# Patient Record
Sex: FEMALE | Race: WHITE | HISPANIC OR LATINO | Employment: OTHER | URBAN - NONMETROPOLITAN AREA
[De-identification: names, ages, dates, MRNs, and addresses within clinical notes are randomized per-mention and may not be internally consistent; named-entity substitution may affect disease eponyms.]

---

## 2023-09-11 ENCOUNTER — HOSPITAL ENCOUNTER (INPATIENT)
Facility: HOSPITAL | Age: 78
LOS: 2 days | Discharge: HOME | DRG: 871 | End: 2023-09-13
Attending: EMERGENCY MEDICINE | Admitting: FAMILY MEDICINE
Payer: MEDICARE

## 2023-09-11 ENCOUNTER — APPOINTMENT (EMERGENCY)
Dept: CT IMAGING | Facility: HOSPITAL | Age: 78
DRG: 871 | End: 2023-09-11
Payer: MEDICARE

## 2023-09-11 ENCOUNTER — APPOINTMENT (EMERGENCY)
Dept: RADIOLOGY | Facility: HOSPITAL | Age: 78
DRG: 871 | End: 2023-09-11
Payer: MEDICARE

## 2023-09-11 DIAGNOSIS — I26.99 ACUTE PULMONARY EMBOLISM, UNSPECIFIED PULMONARY EMBOLISM TYPE, UNSPECIFIED WHETHER ACUTE COR PULMONALE PRESENT (HCC): ICD-10-CM

## 2023-09-11 DIAGNOSIS — R68.89 RIGORS: Primary | ICD-10-CM

## 2023-09-11 DIAGNOSIS — I26.99 PULMONARY EMBOLISM (HCC): ICD-10-CM

## 2023-09-11 DIAGNOSIS — U07.1 SEPSIS DUE TO COVID-19 (HCC): ICD-10-CM

## 2023-09-11 DIAGNOSIS — A41.89 SEPSIS DUE TO COVID-19 (HCC): ICD-10-CM

## 2023-09-11 DIAGNOSIS — N39.0 UTI (URINARY TRACT INFECTION): ICD-10-CM

## 2023-09-11 DIAGNOSIS — U07.1 COVID-19: ICD-10-CM

## 2023-09-11 LAB
2HR DELTA HS TROPONIN: 0 NG/L
ALBUMIN SERPL BCP-MCNC: 4.2 G/DL (ref 3.5–5)
ALP SERPL-CCNC: 72 U/L (ref 34–104)
ALT SERPL W P-5'-P-CCNC: 16 U/L (ref 7–52)
ANION GAP SERPL CALCULATED.3IONS-SCNC: 9 MMOL/L
APTT PPP: 31 SECONDS (ref 23–37)
AST SERPL W P-5'-P-CCNC: 18 U/L (ref 13–39)
BACTERIA UR QL AUTO: ABNORMAL /HPF
BASOPHILS # BLD AUTO: 0.03 THOUSANDS/ÂΜL (ref 0–0.1)
BASOPHILS NFR BLD AUTO: 0 % (ref 0–1)
BILIRUB SERPL-MCNC: 0.47 MG/DL (ref 0.2–1)
BILIRUB UR QL STRIP: NEGATIVE
BNP SERPL-MCNC: 62 PG/ML (ref 0–100)
BUN SERPL-MCNC: 15 MG/DL (ref 5–25)
CALCIUM SERPL-MCNC: 9.3 MG/DL (ref 8.4–10.2)
CARDIAC TROPONIN I PNL SERPL HS: 4 NG/L
CARDIAC TROPONIN I PNL SERPL HS: 4 NG/L
CHLORIDE SERPL-SCNC: 98 MMOL/L (ref 96–108)
CLARITY UR: ABNORMAL
CO2 SERPL-SCNC: 24 MMOL/L (ref 21–32)
COLOR UR: YELLOW
CREAT SERPL-MCNC: 0.82 MG/DL (ref 0.6–1.3)
D DIMER PPP FEU-MCNC: 1.01 UG/ML FEU
EOSINOPHIL # BLD AUTO: 0.03 THOUSAND/ÂΜL (ref 0–0.61)
EOSINOPHIL NFR BLD AUTO: 0 % (ref 0–6)
ERYTHROCYTE [DISTWIDTH] IN BLOOD BY AUTOMATED COUNT: 14.3 % (ref 11.6–15.1)
FLUAV RNA RESP QL NAA+PROBE: NEGATIVE
FLUBV RNA RESP QL NAA+PROBE: NEGATIVE
GFR SERPL CREATININE-BSD FRML MDRD: 69 ML/MIN/1.73SQ M
GLUCOSE SERPL-MCNC: 134 MG/DL (ref 65–140)
GLUCOSE SERPL-MCNC: 137 MG/DL (ref 65–140)
GLUCOSE UR STRIP-MCNC: NEGATIVE MG/DL
HCT VFR BLD AUTO: 37.6 % (ref 34.8–46.1)
HGB BLD-MCNC: 12.7 G/DL (ref 11.5–15.4)
HGB UR QL STRIP.AUTO: ABNORMAL
IMM GRANULOCYTES # BLD AUTO: 0.05 THOUSAND/UL (ref 0–0.2)
IMM GRANULOCYTES NFR BLD AUTO: 1 % (ref 0–2)
KETONES UR STRIP-MCNC: ABNORMAL MG/DL
LACTATE SERPL-SCNC: 1 MMOL/L (ref 0.5–2)
LEUKOCYTE ESTERASE UR QL STRIP: ABNORMAL
LYMPHOCYTES # BLD AUTO: 1.24 THOUSANDS/ÂΜL (ref 0.6–4.47)
LYMPHOCYTES NFR BLD AUTO: 12 % (ref 14–44)
MCH RBC QN AUTO: 30.9 PG (ref 26.8–34.3)
MCHC RBC AUTO-ENTMCNC: 33.8 G/DL (ref 31.4–37.4)
MCV RBC AUTO: 92 FL (ref 82–98)
MONOCYTES # BLD AUTO: 0.93 THOUSAND/ÂΜL (ref 0.17–1.22)
MONOCYTES NFR BLD AUTO: 9 % (ref 4–12)
NEUTROPHILS # BLD AUTO: 8.01 THOUSANDS/ÂΜL (ref 1.85–7.62)
NEUTS SEG NFR BLD AUTO: 78 % (ref 43–75)
NITRITE UR QL STRIP: NEGATIVE
NON-SQ EPI CELLS URNS QL MICRO: ABNORMAL /HPF
NRBC BLD AUTO-RTO: 0 /100 WBCS
PH UR STRIP.AUTO: 5.5 [PH]
PLATELET # BLD AUTO: 340 THOUSANDS/UL (ref 149–390)
PMV BLD AUTO: 9.4 FL (ref 8.9–12.7)
POTASSIUM SERPL-SCNC: 4 MMOL/L (ref 3.5–5.3)
PROT SERPL-MCNC: 7.7 G/DL (ref 6.4–8.4)
PROT UR STRIP-MCNC: NEGATIVE MG/DL
RBC # BLD AUTO: 4.11 MILLION/UL (ref 3.81–5.12)
RBC #/AREA URNS AUTO: ABNORMAL /HPF
RSV RNA RESP QL NAA+PROBE: NEGATIVE
S PYO DNA THROAT QL NAA+PROBE: NOT DETECTED
SARS-COV-2 RNA RESP QL NAA+PROBE: POSITIVE
SODIUM SERPL-SCNC: 131 MMOL/L (ref 135–147)
SP GR UR STRIP.AUTO: 1.01 (ref 1–1.03)
UROBILINOGEN UR QL STRIP.AUTO: 0.2 E.U./DL
WBC # BLD AUTO: 10.29 THOUSAND/UL (ref 4.31–10.16)
WBC #/AREA URNS AUTO: ABNORMAL /HPF

## 2023-09-11 PROCEDURE — 85730 THROMBOPLASTIN TIME PARTIAL: CPT | Performed by: EMERGENCY MEDICINE

## 2023-09-11 PROCEDURE — 99223 1ST HOSP IP/OBS HIGH 75: CPT

## 2023-09-11 PROCEDURE — 96375 TX/PRO/DX INJ NEW DRUG ADDON: CPT

## 2023-09-11 PROCEDURE — 84484 ASSAY OF TROPONIN QUANT: CPT | Performed by: EMERGENCY MEDICINE

## 2023-09-11 PROCEDURE — 83880 ASSAY OF NATRIURETIC PEPTIDE: CPT | Performed by: EMERGENCY MEDICINE

## 2023-09-11 PROCEDURE — 81001 URINALYSIS AUTO W/SCOPE: CPT | Performed by: EMERGENCY MEDICINE

## 2023-09-11 PROCEDURE — 71275 CT ANGIOGRAPHY CHEST: CPT

## 2023-09-11 PROCEDURE — 96365 THER/PROPH/DIAG IV INF INIT: CPT

## 2023-09-11 PROCEDURE — 83605 ASSAY OF LACTIC ACID: CPT | Performed by: EMERGENCY MEDICINE

## 2023-09-11 PROCEDURE — 96367 TX/PROPH/DG ADDL SEQ IV INF: CPT

## 2023-09-11 PROCEDURE — G1004 CDSM NDSC: HCPCS

## 2023-09-11 PROCEDURE — 85025 COMPLETE CBC W/AUTO DIFF WBC: CPT | Performed by: EMERGENCY MEDICINE

## 2023-09-11 PROCEDURE — XW033E5 INTRODUCTION OF REMDESIVIR ANTI-INFECTIVE INTO PERIPHERAL VEIN, PERCUTANEOUS APPROACH, NEW TECHNOLOGY GROUP 5: ICD-10-PCS | Performed by: FAMILY MEDICINE

## 2023-09-11 PROCEDURE — 82948 REAGENT STRIP/BLOOD GLUCOSE: CPT

## 2023-09-11 PROCEDURE — 36415 COLL VENOUS BLD VENIPUNCTURE: CPT | Performed by: EMERGENCY MEDICINE

## 2023-09-11 PROCEDURE — 93005 ELECTROCARDIOGRAM TRACING: CPT

## 2023-09-11 PROCEDURE — 0241U HB NFCT DS VIR RESP RNA 4 TRGT: CPT | Performed by: EMERGENCY MEDICINE

## 2023-09-11 PROCEDURE — 71045 X-RAY EXAM CHEST 1 VIEW: CPT

## 2023-09-11 PROCEDURE — 99284 EMERGENCY DEPT VISIT MOD MDM: CPT

## 2023-09-11 PROCEDURE — 85379 FIBRIN DEGRADATION QUANT: CPT | Performed by: EMERGENCY MEDICINE

## 2023-09-11 PROCEDURE — 87040 BLOOD CULTURE FOR BACTERIA: CPT | Performed by: EMERGENCY MEDICINE

## 2023-09-11 PROCEDURE — 80053 COMPREHEN METABOLIC PANEL: CPT | Performed by: EMERGENCY MEDICINE

## 2023-09-11 PROCEDURE — 87651 STREP A DNA AMP PROBE: CPT | Performed by: EMERGENCY MEDICINE

## 2023-09-11 PROCEDURE — 99285 EMERGENCY DEPT VISIT HI MDM: CPT | Performed by: EMERGENCY MEDICINE

## 2023-09-11 RX ORDER — LORAZEPAM 0.5 MG/1
0.25 TABLET ORAL ONCE
Status: COMPLETED | OUTPATIENT
Start: 2023-09-11 | End: 2023-09-11

## 2023-09-11 RX ORDER — CEFTRIAXONE 1 G/50ML
1000 INJECTION, SOLUTION INTRAVENOUS ONCE
Status: COMPLETED | OUTPATIENT
Start: 2023-09-11 | End: 2023-09-11

## 2023-09-11 RX ORDER — HEPARIN SODIUM 10000 [USP'U]/100ML
3-30 INJECTION, SOLUTION INTRAVENOUS
Status: DISCONTINUED | OUTPATIENT
Start: 2023-09-11 | End: 2023-09-13

## 2023-09-11 RX ORDER — SODIUM CHLORIDE, SODIUM LACTATE, POTASSIUM CHLORIDE, CALCIUM CHLORIDE 600; 310; 30; 20 MG/100ML; MG/100ML; MG/100ML; MG/100ML
150 INJECTION, SOLUTION INTRAVENOUS CONTINUOUS
Status: DISCONTINUED | OUTPATIENT
Start: 2023-09-11 | End: 2023-09-12

## 2023-09-11 RX ADMIN — HEPARIN SODIUM 18 UNITS/KG/HR: 10000 INJECTION, SOLUTION INTRAVENOUS at 22:11

## 2023-09-11 RX ADMIN — IOHEXOL 85 ML: 350 INJECTION, SOLUTION INTRAVENOUS at 21:02

## 2023-09-11 RX ADMIN — SODIUM CHLORIDE, SODIUM LACTATE, POTASSIUM CHLORIDE, AND CALCIUM CHLORIDE 150 ML/HR: .6; .31; .03; .02 INJECTION, SOLUTION INTRAVENOUS at 20:52

## 2023-09-11 RX ADMIN — LORAZEPAM 0.25 MG: 0.5 TABLET ORAL at 20:17

## 2023-09-11 RX ADMIN — CEFTRIAXONE 1000 MG: 1 INJECTION, SOLUTION INTRAVENOUS at 20:27

## 2023-09-11 RX ADMIN — SODIUM CHLORIDE, SODIUM LACTATE, POTASSIUM CHLORIDE, AND CALCIUM CHLORIDE 1000 ML: .6; .31; .03; .02 INJECTION, SOLUTION INTRAVENOUS at 19:38

## 2023-09-11 NOTE — Clinical Note
Lizbeth Jordan was seen and treated in our emergency department on 9/11/2023. No restrictions            Diagnosis:     [place]  may return to work on return date. She may return on this date: 09/15/2023         If you have any questions or concerns, please don't hesitate to call.       Jenny Hudson RN    ______________________________           _______________          _______________  Hospital Representative                              Date                                Time

## 2023-09-11 NOTE — ED PROVIDER NOTES
History  Chief Complaint   Patient presents with   • COVID-19 Exposure     Exposed to someone who was COVID + recently   • Chills     Daughter reports patient has been experiencing chills     82-year-old female with a history of hypertension and diabetes is presenting to the emergency room with the daughter reporting that the patient is experiencing shaking chills on and off since earlier this morning. Patient has been exposed to a relative who is testing COVID-positive. Patient reports that she has had a slight sore throat. Mild cough. No nausea or vomiting. History provided by:  Patient  Flu Symptoms  Presenting symptoms: cough and sore throat    Presenting symptoms: no diarrhea, no fatigue, no fever, no headaches, no myalgias, no nausea and no vomiting    Cough:     Cough characteristics:  Non-productive  Severity:  Unable to specify  Associated symptoms: no congestion    Risk factors: being elderly and diabetes        None       Past Medical History:   Diagnosis Date   • Diabetes mellitus ([address])    • Hypertension        History reviewed. No pertinent surgical history. History reviewed. No pertinent family history. I have reviewed and agree with the history as documented. E-Cigarette/Vaping     E-Cigarette/Vaping Substances     Social History     Tobacco Use   • Smoking status: Never   • Smokeless tobacco: Never       Review of Systems   Constitutional: Negative. Negative for fatigue and fever. HENT: Positive for sore throat. Negative for congestion. Respiratory: Positive for cough. Cardiovascular: Negative. Negative for chest pain. Gastrointestinal: Negative. Negative for diarrhea, nausea and vomiting. Genitourinary: Negative for dysuria, frequency and urgency. Musculoskeletal: Negative for myalgias. Neurological: Negative for headaches. All other systems reviewed and are negative. Physical Exam  Physical Exam  Vitals and nursing note reviewed.    Constitutional: General: She is awake. Appearance: Normal appearance. She is well-developed. She is not ill-appearing or toxic-appearing. HENT:      Head: Normocephalic and atraumatic. Right Ear: Ear canal and external ear normal. Tympanic membrane is injected. Left Ear: Ear canal and external ear normal.      Nose: Nose normal. No congestion or rhinorrhea. Mouth/Throat:      Mouth: Mucous membranes are moist.   Eyes:      General: Lids are normal. No scleral icterus. Extraocular Movements: Extraocular movements intact. Pupils: Pupils are equal, round, and reactive to light. Cardiovascular:      Rate and Rhythm: Regular rhythm. Tachycardia present. Heart sounds: Normal heart sounds. No murmur heard. Pulmonary:      Effort: Pulmonary effort is normal. No respiratory distress. Breath sounds: Normal breath sounds. No wheezing, rhonchi or rales. Abdominal:      General: Abdomen is flat. There is no distension. Palpations: Abdomen is soft. Tenderness: There is no abdominal tenderness. There is no guarding or rebound. Musculoskeletal:         General: No swelling, tenderness or deformity. Normal range of motion. Cervical back: Normal range of motion and neck supple. Skin:     General: Skin is warm and dry. Coloration: Skin is not jaundiced or pale. Findings: No rash. Neurological:      Mental Status: She is alert and oriented to person, place, and time. Mental status is at baseline. Cranial Nerves: No cranial nerve deficit. Motor: No weakness.    Psychiatric:         Attention and Perception: Attention normal.         Mood and Affect: Mood normal.         Speech: Speech normal.         Behavior: Behavior normal.         Vital Signs  ED Triage Vitals [09/11/23 1901]   Temperature Pulse Respirations Blood Pressure SpO2   98.4 °F (36.9 °C) (!) 136 20 132/60 100 %      Temp Source Heart Rate Source Patient Position - Orthostatic VS BP Location FiO2 (%) Temporal Monitor Sitting Left arm --      Pain Score       No Pain           Vitals:    09/11/23 1901 09/11/23 2000   BP: 132/60 144/65   Pulse: (!) 136 (!) 113   Patient Position - Orthostatic VS: Sitting Lying         Visual Acuity      ED Medications  Medications   lactated ringers infusion (has no administration in time range)   cefTRIAXone (ROCEPHIN) IVPB (premix in dextrose) 1,000 mg 50 mL (1,000 mg Intravenous New Bag 9/11/23 2027)   lactated ringers bolus 1,000 mL (0 mL Intravenous Stopped 9/11/23 2027)   LORazepam (ATIVAN) tablet 0.25 mg (0.25 mg Oral Given 9/11/23 2017)       Diagnostic Studies  Results Reviewed     Procedure Component Value Units Date/Time    FLU/RSV/COVID - if FLU/RSV clinically relevant [[id number]]  (Abnormal) Collected: 09/11/23 1930    Lab Status: Final result Specimen: Nares from Nasopharyngeal Swab Updated: 09/11/23 2031     SARS-CoV-2 Positive     INFLUENZA A PCR Negative     INFLUENZA B PCR Negative     RSV PCR Negative    Narrative:      FOR PEDIATRIC PATIENTS - copy/paste COVID Guidelines URL to browser: [email or web address]    SARS-CoV-2 assay is a Nucleic Acid Amplification assay intended for the  qualitative detection of nucleic acid from SARS-CoV-2 in nasopharyngeal  swabs. Results are for the presumptive identification of SARS-CoV-2 RNA. Positive results are indicative of infection with SARS-CoV-2, the virus  causing COVID-19, but do not rule out bacterial infection or co-infection  with other viruses. Laboratories within the [facility] and its  territories are required to report all positive results to the appropriate  public health authorities. Negative results do not preclude SARS-CoV-2  infection and should not be used as the sole basis for treatment or other  patient management decisions. Negative results must be combined with  clinical observations, patient history, and epidemiological information.   This test has not been FDA cleared or approved. This test has been authorized by FDA under an Emergency Use Authorization  (EUA). This test is only authorized for the duration of time the  declaration that circumstances exist justifying the authorization of the  emergency use of an in vitro diagnostic tests for detection of SARS-CoV-2  virus and/or diagnosis of COVID-19 infection under section 564(b)(1) of  the Act, 21 U. S.C. 787GWJ-2(R)(0), unless the authorization is terminated  or revoked sooner. The test has been validated but independent review by FDA  and CLIA is pending. Test performed using [employer] GeneXpert: This RT-PCR assay targets N2,  a region unique to SARS-CoV-2. A conserved region in the E-gene was chosen  for pan-Sarbecovirus detection which includes SARS-CoV-2. According to CMS-2020-01-R, this platform meets the definition of high-throughput technology. D-Dimer [[id number]]  (Abnormal) Collected: 09/11/23 1930    Lab Status: Final result Specimen: Blood from Arm, Left Updated: 09/11/23 2023     D-Dimer, Quant 1.01 ug/ml FEU     Narrative: In the evaluation for possible pulmonary embolism, in the appropriate (Well's Score of 4 or less) patient, the age adjusted d-dimer cutoff for this patient can be calculated as:    Age x 0.01 (in ug/mL) for Age-adjusted D-dimer exclusion threshold for a patient over 50 years.     HS Troponin 0hr (reflex protocol) [[id number]]  (Normal) Collected: 09/11/23 1930    Lab Status: Final result Specimen: Blood from Arm, Left Updated: 09/11/23 2018     hs TnI 0hr 4 ng/L     HS Troponin I 2hr [[id number]]     Lab Status: No result Specimen: Blood     HS Troponin I 4hr [[id number]]     Lab Status: No result Specimen: Blood     B-Type Natriuretic Peptide(BNP) [[id number]]  (Normal) Collected: 09/11/23 1930    Lab Status: Final result Specimen: Blood from Arm, Left Updated: 09/11/23 2017     BNP 62 pg/mL     Strep A PCR [[id number]]  (Normal) Collected: 09/11/23 1930 Lab Status: Final result Specimen: Throat Updated: 09/11/23 2017     STREP A PCR Not Detected    Lactic acid, plasma (w/reflex if result > 2.0) [[id number]]  (Normal) Collected: 09/11/23 1930    Lab Status: Final result Specimen: Blood from Arm, Left Updated: 09/11/23 2011     LACTIC ACID 1.0 mmol/L     Narrative:      Result may be elevated if tourniquet was used during collection.     Comprehensive metabolic panel [[id number]]  (Abnormal) Collected: 09/11/23 1930    Lab Status: Final result Specimen: Blood from Arm, Left Updated: 09/11/23 2011     Sodium 131 mmol/L      Potassium 4.0 mmol/L      Chloride 98 mmol/L      CO2 24 mmol/L      ANION GAP 9 mmol/L      BUN 15 mg/dL      Creatinine 0.82 mg/dL      Glucose 137 mg/dL      Calcium 9.3 mg/dL      AST 18 U/L      ALT 16 U/L      Alkaline Phosphatase 72 U/L      Total Protein 7.7 g/dL      Albumin 4.2 g/dL      Total Bilirubin 0.47 mg/dL      eGFR 69 ml/min/1.73sq m     Narrative:      [place] guidelines for Chronic Kidney Disease (CKD):   •  Stage 1 with normal or high GFR (GFR > 90 mL/min/1.73 square meters)  •  Stage 2 Mild CKD (GFR = 60-89 mL/min/1.73 square meters)  •  Stage 3A Moderate CKD (GFR = 45-59 mL/min/1.73 square meters)  •  Stage 3B Moderate CKD (GFR = 30-44 mL/min/1.73 square meters)  •  Stage 4 Severe CKD (GFR = 15-29 mL/min/1.73 square meters)  •  Stage 5 End Stage CKD (GFR <15 mL/min/1.73 square meters)  Note: GFR calculation is accurate only with a steady state creatinine    Urine Microscopic [[id number]]  (Abnormal) Collected: 09/11/23 1934    Lab Status: Final result Specimen: Urine, Clean Catch Updated: 09/11/23 2003     RBC, UA 1-2 /hpf      WBC, UA 4-10 /hpf      Epithelial Cells Occasional /hpf      Bacteria, UA Occasional /hpf     UA w Reflex to Microscopic w Reflex to Culture [[id number]]  (Abnormal) Collected: 09/11/23 1934    Lab Status: Final result Specimen: Urine, Clean Catch Updated: 09/11/23 1952 Color, UA Yellow     Clarity, UA Slightly Cloudy     Specific Gravity, UA 1.015     pH, UA 5.5     Leukocytes, UA Small     Nitrite, UA Negative     Protein, UA Negative mg/dl      Glucose, UA Negative mg/dl      Ketones, UA 15 (1+) mg/dl      Urobilinogen, UA 0.2 E.U./dl      Bilirubin, UA Negative     Occult Blood, UA Small    CBC and differential [[id number]]  (Abnormal) Collected: 09/11/23 1930    Lab Status: Final result Specimen: Blood from Arm, Left Updated: 09/11/23 1950     WBC 10.29 Thousand/uL      RBC 4.11 Million/uL      Hemoglobin 12.7 g/dL      Hematocrit 37.6 %      MCV 92 fL      MCH 30.9 pg      MCHC 33.8 g/dL      RDW 14.3 %      MPV 9.4 fL      Platelets 605 Thousands/uL      nRBC 0 /100 WBCs      Neutrophils Relative 78 %      Immat GRANS % 1 %      Lymphocytes Relative 12 %      Monocytes Relative 9 %      Eosinophils Relative 0 %      Basophils Relative 0 %      Neutrophils Absolute 8.01 Thousands/µL      Immature Grans Absolute 0.05 Thousand/uL      Lymphocytes Absolute 1.24 Thousands/µL      Monocytes Absolute 0.93 Thousand/µL      Eosinophils Absolute 0.03 Thousand/µL      Basophils Absolute 0.03 Thousands/µL     Blood culture #1 [[id number]] Collected: 09/11/23 1930    Lab Status: In process Specimen: Blood from Arm, Left Updated: 09/11/23 1948    Blood culture #2 [[id number]] Collected: 09/11/23 1937    Lab Status:  In process Specimen: Blood from Arm, Right Updated: 09/11/23 1948                 XR chest 1 view portable   ED Interpretation by Vaishali Huizar DO (09/11 2008)   No active disease      CTA ED chest PE Study    (Results Pending)              Procedures  ECG 12 Lead Documentation Only    Date/Time: 9/11/2023 7:51 PM    Performed by: Vaishali Huizar DO  Authorized by: Vaishali Huizar DO    ECG reviewed by me, the ED Provider: yes    Patient location:  ED  Previous ECG:     Previous ECG:  Unavailable  Interpretation:     Interpretation: abnormal    Rate:     ECG rate assessment: tachycardic    Rhythm:     Rhythm: sinus tachycardia    Ectopy:     Ectopy: none    QRS:     QRS axis:  Normal  Conduction:     Conduction: normal    ST segments:     ST segments:  Non-specific  T waves:     T waves: non-specific               ED Course  ED Course as of 09/11/23 2049   Mon Sep 11, 2023   2007 Leukocytes, UA(!): Small   2007 WBC(!): 10.29   2046 LACTIC ACID: 1.0   2047 STREP A PCR: Not Detected   2047 SARS-COV-2(!): Positive  Patient is COVID-positive with an elevated D-dimer. Will obtain CTA imaging. Patient remains tachycardic. 2048 Patient will be signed out to night shift pending results of CTA. SBIRT 20yo+    Flowsheet Row Most Recent Value   Initial Alcohol Screen: US AUDIT-C     1. How often do you have a drink containing alcohol? 0 Filed at: 09/11/2023 1946   2. How many drinks containing alcohol do you have on a typical day you are drinking? 0 Filed at: 09/11/2023 1946   3b. FEMALE Any Age, or MALE 65+: How often do you have 4 or more drinks on one occassion? 0 Filed at: 09/11/2023 1946   Audit-C Score 0 Filed at: 09/11/2023 1946   FLORIN: How many times in the past year have you. .. Used an illegal drug or used a prescription medication for non-medical reasons? Never Filed at: 09/11/2023 1946                    Medical Decision Making  Patient presented to the emergency department and a MSE was performed. The patient was evaluated for complaint related to acute  fever. Patient is potentially at risk for, but not limited to, pneumonia, urinary tract infection, cholecystitis, appendicitis, diverticulitis,cellulitis, otitis media, strep pharyngitis, meningitis, or uncomplicated viral related illness. Several of these diagnoses have been evaluated and ruled out by history and physical.  As needed, patient will be further evaluated with laboratory and imaging studies. Higher level diagnostics, such as CT imaging or ultrasound, may also be required.   Please see work-up portion of the note for further evaluation of patient's risk. Socioeconomic factors were also considered as part of the decision-making process. Unless otherwise stated in the chart or patient is admitted as elsewhere documented, any previously prescribed medications will be maintained. COVID-19: acute illness or injury  Rigors: acute illness or injury  UTI (urinary tract infection): acute illness or injury  Amount and/or Complexity of Data Reviewed  Labs: ordered. Decision-making details documented in ED Course. Radiology: ordered and independent interpretation performed. Risk  Prescription drug management. Disposition  Final diagnoses:   Rigors   COVID-19   UTI (urinary tract infection)     Time reflects when diagnosis was documented in both MDM as applicable and the Disposition within this note     Time User Action Codes Description Comment    9/11/2023  8:48 PM Henry Coppola Add [R68.89] Rigors     9/11/2023  8:48 PM Verena Navas Add [U07.1] COVID-19     9/11/2023  8:48 PM Verena Horne Yung Add [N39.0] UTI (urinary tract infection)       ED Disposition     None      Follow-up Information    None         Patient's Medications    No medications on file       No discharge procedures on file.     PDMP Review     None          ED Provider  Electronically Signed by           Dorota Bahena DO  09/11/23 204

## 2023-09-11 NOTE — Clinical Note
Aubrey Molina accompanied Dwayne Rodriguez to the emergency department on 9/11/2023. Return date if applicable: 45/99/2733        If you have any questions or concerns, please don't hesitate to call.       Tonio Lauren RN

## 2023-09-12 ENCOUNTER — APPOINTMENT (INPATIENT)
Dept: NON INVASIVE DIAGNOSTICS | Facility: HOSPITAL | Age: 78
DRG: 871 | End: 2023-09-12
Payer: MEDICARE

## 2023-09-12 PROBLEM — I26.99 ACUTE PULMONARY EMBOLISM (HCC): Status: ACTIVE | Noted: 2023-09-12

## 2023-09-12 PROBLEM — A41.89 SEPSIS DUE TO COVID-19 (HCC): Status: ACTIVE | Noted: 2023-09-12

## 2023-09-12 PROBLEM — U07.1 COVID-19: Status: ACTIVE | Noted: 2023-09-12

## 2023-09-12 LAB
4HR DELTA HS TROPONIN: 2 NG/L
ALBUMIN SERPL BCP-MCNC: 3.5 G/DL (ref 3.5–5)
ALP SERPL-CCNC: 58 U/L (ref 34–104)
ALT SERPL W P-5'-P-CCNC: 12 U/L (ref 7–52)
ANION GAP SERPL CALCULATED.3IONS-SCNC: 7 MMOL/L
AORTIC ROOT: 2.8 CM
AORTIC VALVE MEAN VELOCITY: 9.6 M/S
APICAL FOUR CHAMBER EJECTION FRACTION: 81 %
APTT PPP: 137 SECONDS (ref 23–37)
APTT PPP: 179 SECONDS (ref 23–37)
APTT PPP: 192 SECONDS (ref 23–37)
ASCENDING AORTA: 3 CM
AST SERPL W P-5'-P-CCNC: 14 U/L (ref 13–39)
ATRIAL RATE: 124 BPM
AV AREA BY CONTINUOUS VTI: 1.6 CM2
AV AREA PEAK VELOCITY: 1.5 CM2
AV LVOT MEAN GRADIENT: 2 MMHG
AV LVOT PEAK GRADIENT: 4 MMHG
AV MEAN GRADIENT: 4 MMHG
AV PEAK GRADIENT: 9 MMHG
AV VALVE AREA: 1.64 CM2
AV VELOCITY RATIO: 0.64
BASOPHILS # BLD AUTO: 0.03 THOUSANDS/ÂΜL (ref 0–0.1)
BASOPHILS NFR BLD AUTO: 0 % (ref 0–1)
BILIRUB SERPL-MCNC: 0.29 MG/DL (ref 0.2–1)
BUN SERPL-MCNC: 12 MG/DL (ref 5–25)
CALCIUM SERPL-MCNC: 8.6 MG/DL (ref 8.4–10.2)
CARDIAC TROPONIN I PNL SERPL HS: 6 NG/L
CHLORIDE SERPL-SCNC: 100 MMOL/L (ref 96–108)
CO2 SERPL-SCNC: 25 MMOL/L (ref 21–32)
CREAT SERPL-MCNC: 0.77 MG/DL (ref 0.6–1.3)
DOP CALC AO PEAK VEL: 1.48 M/S
DOP CALC AO VTI: 26.73 CM
DOP CALC LVOT AREA: 2.27 CM2
DOP CALC LVOT CARDIAC INDEX: 2.13 L/MIN/M2
DOP CALC LVOT CARDIAC OUTPUT: 3.56 L/MIN
DOP CALC LVOT DIAMETER: 1.7 CM
DOP CALC LVOT PEAK VEL VTI: 19.32 CM
DOP CALC LVOT PEAK VEL: 0.95 M/S
DOP CALC LVOT STROKE VOLUME: 43.83 CM3
E WAVE DECELERATION TIME: 180 MS
EOSINOPHIL # BLD AUTO: 0.01 THOUSAND/ÂΜL (ref 0–0.61)
EOSINOPHIL NFR BLD AUTO: 0 % (ref 0–6)
ERYTHROCYTE [DISTWIDTH] IN BLOOD BY AUTOMATED COUNT: 14.6 % (ref 11.6–15.1)
FRACTIONAL SHORTENING: 20 % (ref 28–44)
GFR SERPL CREATININE-BSD FRML MDRD: 75 ML/MIN/1.73SQ M
GLUCOSE SERPL-MCNC: 120 MG/DL (ref 65–140)
GLUCOSE SERPL-MCNC: 128 MG/DL (ref 65–140)
GLUCOSE SERPL-MCNC: 143 MG/DL (ref 65–140)
GLUCOSE SERPL-MCNC: 182 MG/DL (ref 65–140)
GLUCOSE SERPL-MCNC: 185 MG/DL (ref 65–140)
HCT VFR BLD AUTO: 33.7 % (ref 34.8–46.1)
HGB BLD-MCNC: 11.7 G/DL (ref 11.5–15.4)
IMM GRANULOCYTES # BLD AUTO: 0.02 THOUSAND/UL (ref 0–0.2)
IMM GRANULOCYTES NFR BLD AUTO: 0 % (ref 0–2)
INTERVENTRICULAR SEPTUM IN DIASTOLE (PARASTERNAL SHORT AXIS VIEW): 1.4 CM
INTERVENTRICULAR SEPTUM: 1.4 CM (ref 0.6–1.1)
LAAS-AP2: 12.8 CM2
LAAS-AP4: 13.1 CM2
LEFT ATRIUM SIZE: 3 CM
LEFT ATRIUM VOLUME (MOD BIPLANE): 28 ML
LEFT INTERNAL DIMENSION IN SYSTOLE: 2.4 CM (ref 2.1–4)
LEFT VENTRICLE DIASTOLIC VOLUME (MOD BIPLANE): 51 ML
LEFT VENTRICLE SYSTOLIC VOLUME (MOD BIPLANE): 12 ML
LEFT VENTRICULAR INTERNAL DIMENSION IN DIASTOLE: 3 CM (ref 3.5–6)
LEFT VENTRICULAR POSTERIOR WALL IN END DIASTOLE: 1.4 CM
LEFT VENTRICULAR STROKE VOLUME: 14 ML
LV EF: 76 %
LVSV (TEICH): 14 ML
LYMPHOCYTES # BLD AUTO: 1.95 THOUSANDS/ÂΜL (ref 0.6–4.47)
LYMPHOCYTES NFR BLD AUTO: 24 % (ref 14–44)
MCH RBC QN AUTO: 31.8 PG (ref 26.8–34.3)
MCHC RBC AUTO-ENTMCNC: 34.7 G/DL (ref 31.4–37.4)
MCV RBC AUTO: 92 FL (ref 82–98)
MONOCYTES # BLD AUTO: 0.81 THOUSAND/ÂΜL (ref 0.17–1.22)
MONOCYTES NFR BLD AUTO: 10 % (ref 4–12)
MV E'TISSUE VEL-LAT: 5 CM/S
MV E'TISSUE VEL-SEP: 7 CM/S
MV PEAK A VEL: 0.61 M/S
MV PEAK E VEL: 54 CM/S
MV STENOSIS PRESSURE HALF TIME: 52 MS
MV VALVE AREA P 1/2 METHOD: 4.23 CM2
NEUTROPHILS # BLD AUTO: 5.35 THOUSANDS/ÂΜL (ref 1.85–7.62)
NEUTS SEG NFR BLD AUTO: 66 % (ref 43–75)
NRBC BLD AUTO-RTO: 0 /100 WBCS
P AXIS: 21 DEGREES
PLATELET # BLD AUTO: 281 THOUSANDS/UL (ref 149–390)
PMV BLD AUTO: 9.2 FL (ref 8.9–12.7)
POTASSIUM SERPL-SCNC: 3.5 MMOL/L (ref 3.5–5.3)
PR INTERVAL: 166 MS
PROCALCITONIN SERPL-MCNC: 0.07 NG/ML
PROT SERPL-MCNC: 6.5 G/DL (ref 6.4–8.4)
PV PEAK GRADIENT: 3 MMHG
QRS AXIS: 215 DEGREES
QRSD INTERVAL: 78 MS
QT INTERVAL: 294 MS
QTC INTERVAL: 422 MS
RBC # BLD AUTO: 3.68 MILLION/UL (ref 3.81–5.12)
RIGHT VENTRICLE ID DIMENSION: 2.7 CM
SL CV LEFT ATRIUM LENGTH A2C: 4.9 CM
SL CV LV EF: 65
SL CV PED ECHO LEFT VENTRICLE DIASTOLIC VOLUME (MOD BIPLANE) 2D: 34 ML
SL CV PED ECHO LEFT VENTRICLE SYSTOLIC VOLUME (MOD BIPLANE) 2D: 20 ML
SODIUM SERPL-SCNC: 132 MMOL/L (ref 135–147)
T WAVE AXIS: 21 DEGREES
TRICUSPID ANNULAR PLANE SYSTOLIC EXCURSION: 2.1 CM
VENTRICULAR RATE: 124 BPM
WBC # BLD AUTO: 8.17 THOUSAND/UL (ref 4.31–10.16)

## 2023-09-12 PROCEDURE — 93306 TTE W/DOPPLER COMPLETE: CPT | Performed by: STUDENT IN AN ORGANIZED HEALTH CARE EDUCATION/TRAINING PROGRAM

## 2023-09-12 PROCEDURE — 85730 THROMBOPLASTIN TIME PARTIAL: CPT | Performed by: EMERGENCY MEDICINE

## 2023-09-12 PROCEDURE — 84484 ASSAY OF TROPONIN QUANT: CPT | Performed by: EMERGENCY MEDICINE

## 2023-09-12 PROCEDURE — 99232 SBSQ HOSP IP/OBS MODERATE 35: CPT | Performed by: FAMILY MEDICINE

## 2023-09-12 PROCEDURE — 80053 COMPREHEN METABOLIC PANEL: CPT

## 2023-09-12 PROCEDURE — 93306 TTE W/DOPPLER COMPLETE: CPT

## 2023-09-12 PROCEDURE — 85730 THROMBOPLASTIN TIME PARTIAL: CPT | Performed by: FAMILY MEDICINE

## 2023-09-12 PROCEDURE — 82948 REAGENT STRIP/BLOOD GLUCOSE: CPT

## 2023-09-12 PROCEDURE — 85025 COMPLETE CBC W/AUTO DIFF WBC: CPT

## 2023-09-12 PROCEDURE — 84145 PROCALCITONIN (PCT): CPT

## 2023-09-12 RX ORDER — ACETAMINOPHEN 325 MG/1
650 TABLET ORAL EVERY 4 HOURS PRN
Status: DISCONTINUED | OUTPATIENT
Start: 2023-09-12 | End: 2023-09-13 | Stop reason: HOSPADM

## 2023-09-12 RX ORDER — CALCIUM CARBONATE 500 MG/1
1000 TABLET, CHEWABLE ORAL DAILY PRN
Status: DISCONTINUED | OUTPATIENT
Start: 2023-09-12 | End: 2023-09-13 | Stop reason: HOSPADM

## 2023-09-12 RX ORDER — FUROSEMIDE 40 MG/1
40 TABLET ORAL DAILY
Status: DISCONTINUED | OUTPATIENT
Start: 2023-09-12 | End: 2023-09-12

## 2023-09-12 RX ORDER — AMLODIPINE BESYLATE 10 MG/1
10 TABLET ORAL DAILY
COMMUNITY

## 2023-09-12 RX ORDER — SERTRALINE HYDROCHLORIDE 25 MG/1
25 TABLET, FILM COATED ORAL DAILY
COMMUNITY
Start: 2023-09-02

## 2023-09-12 RX ORDER — GLIPIZIDE 5 MG/1
5 TABLET ORAL
COMMUNITY
Start: 2023-06-29

## 2023-09-12 RX ORDER — BIMATOPROST 0.3 MG/ML
1 SOLUTION/ DROPS OPHTHALMIC DAILY
Status: DISCONTINUED | OUTPATIENT
Start: 2023-09-12 | End: 2023-09-13 | Stop reason: HOSPADM

## 2023-09-12 RX ORDER — CEFTRIAXONE 1 G/50ML
1000 INJECTION, SOLUTION INTRAVENOUS EVERY 24 HOURS
Status: DISCONTINUED | OUTPATIENT
Start: 2023-09-12 | End: 2023-09-13 | Stop reason: HOSPADM

## 2023-09-12 RX ORDER — ATORVASTATIN CALCIUM 20 MG/1
20 TABLET, FILM COATED ORAL
Status: DISCONTINUED | OUTPATIENT
Start: 2023-09-12 | End: 2023-09-13 | Stop reason: HOSPADM

## 2023-09-12 RX ORDER — INSULIN LISPRO 100 [IU]/ML
1-5 INJECTION, SOLUTION INTRAVENOUS; SUBCUTANEOUS
Status: DISCONTINUED | OUTPATIENT
Start: 2023-09-12 | End: 2023-09-13 | Stop reason: HOSPADM

## 2023-09-12 RX ORDER — ATORVASTATIN CALCIUM 20 MG/1
20 TABLET, FILM COATED ORAL
COMMUNITY
Start: 2023-06-29

## 2023-09-12 RX ORDER — BENZONATATE 100 MG/1
100 CAPSULE ORAL 3 TIMES DAILY PRN
Status: DISCONTINUED | OUTPATIENT
Start: 2023-09-12 | End: 2023-09-13 | Stop reason: HOSPADM

## 2023-09-12 RX ORDER — FUROSEMIDE 40 MG/1
40 TABLET ORAL DAILY
COMMUNITY
Start: 2023-06-29 | End: 2023-09-13

## 2023-09-12 RX ORDER — ONDANSETRON 2 MG/ML
4 INJECTION INTRAMUSCULAR; INTRAVENOUS EVERY 6 HOURS PRN
Status: DISCONTINUED | OUTPATIENT
Start: 2023-09-12 | End: 2023-09-13 | Stop reason: HOSPADM

## 2023-09-12 RX ORDER — LISINOPRIL 40 MG/1
40 TABLET ORAL DAILY
COMMUNITY
Start: 2023-06-29

## 2023-09-12 RX ORDER — FUROSEMIDE 40 MG/1
40 TABLET ORAL DAILY
Status: DISCONTINUED | OUTPATIENT
Start: 2023-09-13 | End: 2023-09-13 | Stop reason: HOSPADM

## 2023-09-12 RX ORDER — GUAIFENESIN 600 MG/1
600 TABLET, EXTENDED RELEASE ORAL EVERY 12 HOURS SCHEDULED
Status: DISCONTINUED | OUTPATIENT
Start: 2023-09-12 | End: 2023-09-13 | Stop reason: HOSPADM

## 2023-09-12 RX ORDER — METOPROLOL SUCCINATE 100 MG/1
100 TABLET, EXTENDED RELEASE ORAL DAILY
Status: DISCONTINUED | OUTPATIENT
Start: 2023-09-12 | End: 2023-09-13 | Stop reason: HOSPADM

## 2023-09-12 RX ORDER — CEFTRIAXONE 1 G/50ML
1000 INJECTION, SOLUTION INTRAVENOUS EVERY 24 HOURS
Status: DISCONTINUED | OUTPATIENT
Start: 2023-09-12 | End: 2023-09-12

## 2023-09-12 RX ORDER — METOPROLOL SUCCINATE 100 MG/1
100 TABLET, EXTENDED RELEASE ORAL DAILY
COMMUNITY
Start: 2023-06-29

## 2023-09-12 RX ORDER — LISINOPRIL 20 MG/1
40 TABLET ORAL DAILY
Status: DISCONTINUED | OUTPATIENT
Start: 2023-09-12 | End: 2023-09-13 | Stop reason: HOSPADM

## 2023-09-12 RX ORDER — SERTRALINE HYDROCHLORIDE 25 MG/1
25 TABLET, FILM COATED ORAL DAILY
Status: DISCONTINUED | OUTPATIENT
Start: 2023-09-12 | End: 2023-09-13 | Stop reason: HOSPADM

## 2023-09-12 RX ORDER — FAMOTIDINE 20 MG/1
20 TABLET, FILM COATED ORAL DAILY PRN
COMMUNITY
Start: 2023-07-20 | End: 2023-09-13

## 2023-09-12 RX ADMIN — SERTRALINE HYDROCHLORIDE 25 MG: 25 TABLET, FILM COATED ORAL at 10:08

## 2023-09-12 RX ADMIN — ATORVASTATIN CALCIUM 20 MG: 20 TABLET, FILM COATED ORAL at 15:45

## 2023-09-12 RX ADMIN — GUAIFENESIN 600 MG: 600 TABLET ORAL at 10:06

## 2023-09-12 RX ADMIN — BIMATOPROST 1 DROP: 0.3 SOLUTION/ DROPS OPHTHALMIC at 10:11

## 2023-09-12 RX ADMIN — REMDESIVIR 200 MG: 100 INJECTION, POWDER, LYOPHILIZED, FOR SOLUTION INTRAVENOUS at 01:27

## 2023-09-12 RX ADMIN — LISINOPRIL 40 MG: 20 TABLET ORAL at 10:06

## 2023-09-12 RX ADMIN — INSULIN LISPRO 1 UNITS: 100 INJECTION, SOLUTION INTRAVENOUS; SUBCUTANEOUS at 15:45

## 2023-09-12 RX ADMIN — METOPROLOL SUCCINATE 100 MG: 100 TABLET, EXTENDED RELEASE ORAL at 10:06

## 2023-09-12 RX ADMIN — CEFTRIAXONE 1000 MG: 1 INJECTION, SOLUTION INTRAVENOUS at 20:51

## 2023-09-12 RX ADMIN — GUAIFENESIN 600 MG: 600 TABLET ORAL at 20:51

## 2023-09-12 NOTE — ASSESSMENT & PLAN NOTE
· Presents with chills and cold-like symptoms for the past 24 hours. Exposure to sick family member. In the ED tested positive for COVID on 9/11/2023. ·  In the ED found to be tachycardic with elevated D-dimer, CTA pe study showing: small filling defect pulmonary emboli within the segmental to subsegmental right lower lobe and right upper lobe pulmonary arteries. RV /LV ratio is less than 0.9. · Troponins negative. Stable on room air. · Low risk PESI score  · Suspect provoked in the setting of COVID-19. No prior history of blood clots per daughter.   · Continue heparin VTE protocol  · echocardiogram  · Monitor on telemetry  · Consult case management for anticoagulation pricing

## 2023-09-12 NOTE — PLAN OF CARE
Problem: RESPIRATORY - ADULT  Goal: Achieves optimal ventilation and oxygenation  Description: INTERVENTIONS:  - Assess for changes in respiratory status JEFFREY, SOB cyanosis  - Assess for changes in mentation and behavior  - Position to facilitate oxygenation and minimize respiratory effort  - Oxygen administered by appropriate delivery if ordered  - Initiate smoking cessation education as indicated  - Encourage broncho-pulmonary hygiene including cough, deep breathe, Incentive Spirometry  - Assess the need for suctioning and aspirate as needed  - Assess and instruct to report SOB or any respiratory difficulty  - Respiratory Therapy support as indicated  Outcome: Progressing

## 2023-09-12 NOTE — ASSESSMENT & PLAN NOTE
No results found for: "HGBA1C"    Recent Labs     09/11/23  2347 09/12/23  0821 09/12/23  1210 09/12/23  1512   POCGLU 134 128 143* 182*       Blood Sugar Average: Last 72 hrs:  (P) 146.75   · Hold Oral diabetic medications  · Start sliding scale insulin  · Hypoglycemia protocol

## 2023-09-12 NOTE — ASSESSMENT & PLAN NOTE
· Present on admission as evidenced by tachycardia, tachypnea, COVID-positive  · CT chest without any evidence of pneumonia  · Admit to mild COVID pathway, patient is currently on room air  · Start remdesivir x4 days , high risk patient due to comorbidities  · Procalcitonin negative x1. We will continue Rocephin for now , consider discontinue antibiotics if procalcitonin negative x2.   · Encourage incentive spirometry and proning

## 2023-09-12 NOTE — ED CARE HANDOFF
Emergency Department Sign Out Note        Sign out and transfer of care from Dr. Maira Rader. See Separate Emergency Department note. The patient, [place], was evaluated by the previous provider for covid 19 chills. Workup Completed:  Labs Reviewed   COVID19, INFLUENZA A/B, RSV PCR, [facility] - Abnormal       Result Value Ref Range Status    SARS-CoV-2 Positive (*) Negative Final    INFLUENZA A PCR Negative  Negative Final    INFLUENZA B PCR Negative  Negative Final    RSV PCR Negative  Negative Final    Narrative:     FOR PEDIATRIC PATIENTS - copy/paste COVID Guidelines URL to browser: [email or web address]    SARS-CoV-2 assay is a Nucleic Acid Amplification assay intended for the  qualitative detection of nucleic acid from SARS-CoV-2 in nasopharyngeal  swabs. Results are for the presumptive identification of SARS-CoV-2 RNA. Positive results are indicative of infection with SARS-CoV-2, the virus  causing COVID-19, but do not rule out bacterial infection or co-infection  with other viruses. Laboratories within the [facility] and its  territories are required to report all positive results to the appropriate  public health authorities. Negative results do not preclude SARS-CoV-2  infection and should not be used as the sole basis for treatment or other  patient management decisions. Negative results must be combined with  clinical observations, patient history, and epidemiological information. This test has not been FDA cleared or approved. This test has been authorized by FDA under an Emergency Use Authorization  (EUA). This test is only authorized for the duration of time the  declaration that circumstances exist justifying the authorization of the  emergency use of an in vitro diagnostic tests for detection of SARS-CoV-2  virus and/or diagnosis of COVID-19 infection under section 564(b)(1) of  the Act, 21 U. S.C. 458FUH-2(Z)(7), unless the authorization is terminated  or revoked sooner. The test has been validated but independent review by FDA  and CLIA is pending. Test performed using [employer] GeneXpert: This RT-PCR assay targets N2,  a region unique to SARS-CoV-2. A conserved region in the E-gene was chosen  for pan-Sarbecovirus detection which includes SARS-CoV-2. According to CMS-2020-01-R, this platform meets the definition of high-throughput technology. COMPREHENSIVE METABOLIC PANEL - Abnormal    Sodium 131 (*) 135 - 147 mmol/L Final    Potassium 4.0  3.5 - 5.3 mmol/L Final    Chloride 98  96 - 108 mmol/L Final    CO2 24  21 - 32 mmol/L Final    ANION GAP 9  mmol/L Final    BUN 15  5 - 25 mg/dL Final    Creatinine 0.82  0.60 - 1.30 mg/dL Final    Comment: Standardized to IDMS reference method    Glucose 137  65 - 140 mg/dL Final    Comment: If the patient is fasting, the ADA then defines impaired fasting glucose as > 100 mg/dL and diabetes as > or equal to 123 mg/dL. Calcium 9.3  8.4 - 10.2 mg/dL Final    AST 18  13 - 39 U/L Final    ALT 16  7 - 52 U/L Final    Comment: Specimen collection should occur prior to Sulfasalazine administration due to the potential for falsely depressed results. Alkaline Phosphatase 72  34 - 104 U/L Final    Total Protein 7.7  6.4 - 8.4 g/dL Final    Albumin 4.2  3.5 - 5.0 g/dL Final    Total Bilirubin 0.47  0.20 - 1.00 mg/dL Final    Comment: Use of this assay is not recommended for patients undergoing treatment with eltrombopag due to the potential for falsely elevated results. N-acetyl-p-benzoquinone imine (metabolite of Acetaminophen) will generate erroneously low results in samples for patients that have taken an overdose of Acetaminophen.     eGFR 69  ml/min/1.73sq m Final    Narrative:     [place] guidelines for Chronic Kidney Disease (CKD):   •  Stage 1 with normal or high GFR (GFR > 90 mL/min/1.73 square meters)  •  Stage 2 Mild CKD (GFR = 60-89 mL/min/1.73 square meters)  •  Stage 3A Moderate CKD (GFR = 45-59 mL/min/1.73 square meters)  •  Stage 3B Moderate CKD (GFR = 30-44 mL/min/1.73 square meters)  •  Stage 4 Severe CKD (GFR = 15-29 mL/min/1.73 square meters)  •  Stage 5 End Stage CKD (GFR <15 mL/min/1.73 square meters)  Note: GFR calculation is accurate only with a steady state creatinine   CBC AND DIFFERENTIAL - Abnormal    WBC 10.29 (*) 4.31 - 10.16 Thousand/uL Final    RBC 4.11  3.81 - 5.12 Million/uL Final    Hemoglobin 12.7  11.5 - 15.4 g/dL Final    Hematocrit 37.6  34.8 - 46.1 % Final    MCV 92  82 - 98 fL Final    MCH 30.9  26.8 - 34.3 pg Final    MCHC 33.8  31.4 - 37.4 g/dL Final    RDW 14.3  11.6 - 15.1 % Final    MPV 9.4  8.9 - 12.7 fL Final    Platelets 708  663 - 390 Thousands/uL Final    nRBC 0  /100 WBCs Final    Neutrophils Relative 78 (*) 43 - 75 % Final    Immat GRANS % 1  0 - 2 % Final    Lymphocytes Relative 12 (*) 14 - 44 % Final    Monocytes Relative 9  4 - 12 % Final    Eosinophils Relative 0  0 - 6 % Final    Basophils Relative 0  0 - 1 % Final    Neutrophils Absolute 8.01 (*) 1.85 - 7.62 Thousands/µL Final    Immature Grans Absolute 0.05  0.00 - 0.20 Thousand/uL Final    Lymphocytes Absolute 1.24  0.60 - 4.47 Thousands/µL Final    Monocytes Absolute 0.93  0.17 - 1.22 Thousand/µL Final    Eosinophils Absolute 0.03  0.00 - 0.61 Thousand/µL Final    Basophils Absolute 0.03  0.00 - 0.10 Thousands/µL Final   D-DIMER, QUANTITATIVE - Abnormal    D-Dimer, Quant 1.01 (*) <0.50 ug/ml FEU Final    Comment: Reference and upper limits to exclude DVT and PE are the same. Do not use to exclude if clinical symptoms are present. Pregnant women:  1st trimester:  <0.22 - 1.06 ug/ml FEU  2nd trimester:  <0.22 - 1.88 ug/ml FEU  3rd trimester:   0.24 - 3.28 ug/ml FEU    Note: Normal ranges may not apply to patients who are transgender, non-binary, or whose legal sex, sex at birth, and gender identity differ. Narrative:      In the evaluation for possible pulmonary embolism, in the appropriate (Well's Score of 4 or less) patient, the age adjusted d-dimer cutoff for this patient can be calculated as:    Age x 0.01 (in ug/mL) for Age-adjusted D-dimer exclusion threshold for a patient over 50 years. UA W REFLEX TO MICROSCOPIC WITH REFLEX TO CULTURE - Abnormal    Color, UA Yellow   Final    Clarity, UA Slightly Cloudy   Final    Specific Gravity, UA 1.015  1.003 - 1.030 Final    pH, UA 5.5  4.5, 5.0, 5.5, 6.0, 6.5, 7.0, 7.5, 8.0 Final    Leukocytes, UA Small (*) Negative Final    Nitrite, UA Negative  Negative Final    Protein, UA Negative  Negative mg/dl Final    Glucose, UA Negative  Negative mg/dl Final    Ketones, UA 15 (1+) (*) Negative mg/dl Final    Urobilinogen, UA 0.2  0.2, 1.0 E.U./dl E.U./dl Final    Bilirubin, UA Negative  Negative Final    Occult Blood, UA Small (*) Negative Final   URINE MICROSCOPIC - Abnormal    RBC, UA 1-2  None Seen, 0-1, 1-2, 2-4, 0-5 /hpf Final    WBC, UA 4-10 (*) None Seen, 0-1, 1-2, 0-5, 2-4 /hpf Final    Epithelial Cells Occasional  None Seen, Occasional /hpf Final    Bacteria, UA Occasional  None Seen, Occasional /hpf Final   STREP A PCR - Normal    STREP A PCR Not Detected  Not Detected Final   LACTIC ACID, PLASMA (W/REFLEX IF RESULT > 2.0) - Normal    LACTIC ACID 1.0  0.5 - 2.0 mmol/L Final    Narrative:     Result may be elevated if tourniquet was used during collection. HS TROPONIN I 0HR - Normal    hs TnI 0hr 4  "Refer to ACS Flowchart"- see link ng/L Final    Comment:                                              Initial (time 0) result  If >=50 ng/L, Myocardial injury suggested ;  Type of myocardial injury and treatment strategy  to be determined. If 5-49 ng/L, a delta result at 2 hours and or 4 hours will be needed to further evaluate. If <4 ng/L, and chest pain has been >3 hours since onset, patient may qualify for discharge based on the HEART score in the ED.   If <5 ng/L and <3hours since onset of chest pain, a delta result at 2 hours will be needed to further evaluate. HS Troponin 99th Percentile URL of a Health Population=12 ng/L with a 95% Confidence Interval of 8-18 ng/L. Second Troponin (time 2 hours)  If calculated delta >= 20 ng/L,  Myocardial injury suggested ; Type of myocardial injury and treatment strategy to be determined. If 5-49 ng/L and the calculated delta is 5-19 ng/L, consult medical service for evaluation. Continue evaluation for ischemia on ecg and other possible etiology and repeat hs troponin at 4 hours. If delta is <5 ng/L at 2 hours, consider discharge based on risk stratification via the HEART score (if in ED), or ROWAN risk score in IP/Observation. HS Troponin 99th Percentile URL of a Health Population=12 ng/L with a 95% Confidence Interval of 8-18 ng/L.   B-TYPE NATRIURETIC PEPTIDE (BNP) - Normal    BNP 62  0 - 100 pg/mL Final   HS TROPONIN I 2HR - Normal    hs TnI 2hr 4  "Refer to ACS Flowchart"- see link ng/L Final    Comment:                                              Initial (time 0) result  If >=50 ng/L, Myocardial injury suggested ;  Type of myocardial injury and treatment strategy  to be determined. If 5-49 ng/L, a delta result at 2 hours and or 4 hours will be needed to further evaluate. If <4 ng/L, and chest pain has been >3 hours since onset, patient may qualify for discharge based on the HEART score in the ED. If <5 ng/L and <3hours since onset of chest pain, a delta result at 2 hours will be needed to further evaluate. HS Troponin 99th Percentile URL of a Health Population=12 ng/L with a 95% Confidence Interval of 8-18 ng/L. Second Troponin (time 2 hours)  If calculated delta >= 20 ng/L,  Myocardial injury suggested ; Type of myocardial injury and treatment strategy to be determined. If 5-49 ng/L and the calculated delta is 5-19 ng/L, consult medical service for evaluation.   Continue evaluation for ischemia on ecg and other possible etiology and repeat hs troponin at 4 hours. If delta is <5 ng/L at 2 hours, consider discharge based on risk stratification via the HEART score (if in ED), or ROWAN risk score in IP/Observation. HS Troponin 99th Percentile URL of a Health Population=12 ng/L with a 95% Confidence Interval of 8-18 ng/L. Delta 2hr hsTnI 0  <20 ng/L Final   BLOOD CULTURE   BLOOD CULTURE   HS TROPONIN I 4HR   APTT         ED Course / Workup Pending (followup):     CTA ED chest PE Study    Result Date: 9/11/2023  Narrative: CTA - CHEST WITH IV CONTRAST - PULMONARY ANGIOGRAM INDICATION:   Pulmonary embolism (PE) suspected, positive D-dimer COVID-positive with D-dimer above cutoff value. . COMPARISON: None. TECHNIQUE: CTA examination of the chest was performed using angiographic technique according to a protocol specifically tailored to evaluate for pulmonary embolism. Multiplanar 2D reformatted images were created from the source data. In addition, coronal 3D MIP postprocessing was performed on the acquisition scanner. Radiation dose length product (DLP) for this visit:  223 mGy-cm . This examination, like all CT scans performed in the [facility], was performed utilizing techniques to minimize radiation dose exposure, including the use of iterative reconstruction and automated exposure control. IV Contrast:  85 mL of iohexol (OMNIPAQUE) FINDINGS: PULMONARY ARTERIAL TREE: There is a small filling defect within a segmental to subsegmental right lower lobe pulmonary artery (series 2, image 127). There is probably a filling defect within a segmental left lower lobe pulmonary artery (series 2, image 113). LUNGS:  Lungs are clear. There is no tracheal or endobronchial lesion. PLEURA:  Unremarkable. HEART/GREAT VESSELS: Coronary calcifications. No pericardial effusion. No thoracic aortic aneurysm. MEDIASTINUM AND SKIP:  Unremarkable. CHEST WALL AND LOWER NECK:   Unremarkable. VISUALIZED STRUCTURES IN THE UPPER ABDOMEN:  Unremarkable.  OSSEOUS STRUCTURES: Spinal degenerative changes are noted. No acute fracture or destructive osseous lesion. Impression: Small filling defect/pulmonary emboli within segmental to subsegmental right lower lobe and right upper lobe pulmonary arteries. Measured RV/LV ratio is within normal limits at less than 0.9. I personally discussed this study with Kezia Wyatt on 9/11/2023 9:50 PM. Workstation performed: [id number]                               ED Course as of 09/11/23 2210   Mon Sep 11, 2023   2055 Care assumed from Dr. Johs Sands pending CTA chest r/o pe.   2206 CT results noted patient and family informed of results finding and recommended treatment with heparin drip and admission patient and family agree. Spoke with hospitalist advanced practitioner on-call Gm Cullen reviewed case and findings in the emergency department and management thus far accepts for admission. Procedures  Medical Decision Making  COVID-19: acute illness or injury  Pulmonary embolism [facility]): acute illness or injury  Rigors: acute illness or injury  UTI (urinary tract infection): acute illness or injury  Amount and/or Complexity of Data Reviewed  Labs: ordered. Decision-making details documented in ED Course. Radiology: ordered and independent interpretation performed. Decision-making details documented in ED Course. ECG/medicine tests: ordered and independent interpretation performed. Decision-making details documented in ED Course. Risk  Prescription drug management. Decision regarding hospitalization.               Disposition  Final diagnoses:   Rigors   COVID-19   UTI (urinary tract infection)   Pulmonary embolism ([address])     Time reflects when diagnosis was documented in both MDM as applicable and the Disposition within this note     Time User Action Codes Description Comment    9/11/2023  8:48 PM Ayan Roldan [R68.89] Rigors     9/11/2023  8:48 PM Stephanie Huynh Add [U07.1] COVID-19     9/11/2023  8:48 PM Erin Nixon Add [N39.0] UTI (urinary tract infection)     9/11/2023  9:57 PM Jerson Verduzco Add [I26.99] Pulmonary embolism [facility])       ED Disposition     ED Disposition   Admit    Condition   Stable    Date/Time   Mon Sep 11, 2023  9:57 PM    Comment   Case was discussed with Annette Parker and the patient's admission status was agreed to be Admission Status: inpatient status to the service of Dr. Tameka Romano. Follow-up Information    None       Patient's Medications    No medications on file     No discharge procedures on file.        ED Provider  Electronically Signed by     Matteo Winkler DO  09/11/23 1994

## 2023-09-12 NOTE — ASSESSMENT & PLAN NOTE
No results found for: "HGBA1C"    Recent Labs     09/11/23  2347   POCGLU 134       Blood Sugar Average: Last 72 hrs:  (P) 134   · Hold Oral diabetic medications  · Start sliding scale insulin  · Hypoglycemia protocol

## 2023-09-12 NOTE — ED NOTES
Pt assisted to the bathroom x1. Pt tolerated well and back in bed with call bell in reach. Pt denies any complaints at this time. Daughter at bedside.       Bhargav Hall RN  09/11/23 4582

## 2023-09-12 NOTE — PLAN OF CARE
Problem: Potential for Falls  Goal: Patient will remain free of falls  Description: INTERVENTIONS:  - Educate patient/family on patient safety including physical limitations  - Instruct patient to call for assistance with activity   - Consult OT/PT to assist with strengthening/mobility   - Keep Call bell within reach  - Keep bed low and locked with side rails adjusted as appropriate  - Keep care items and personal belongings within reach  - Initiate and maintain comfort rounds  - Make Fall Risk Sign visible to staff  - Offer Toileting every 2 Hours, in advance of need  - Initiate/Maintain alarm  - Obtain necessary fall risk management equipment:   - Apply yellow socks and bracelet for high fall risk patients  - Consider moving patient to room near nurses station  Outcome: Progressing     Problem: RESPIRATORY - ADULT  Goal: Achieves optimal ventilation and oxygenation  Description: INTERVENTIONS:  - Assess for changes in respiratory status JEFFREY, SOB cyanosis  - Assess for changes in mentation and behavior  - Position to facilitate oxygenation and minimize respiratory effort  - Oxygen administered by appropriate delivery if ordered  - Initiate smoking cessation education as indicated  - Encourage broncho-pulmonary hygiene including cough, deep breathe, Incentive Spirometry  - Assess the need for suctioning and aspirate as needed  - Assess and instruct to report SOB or any respiratory difficulty  - Respiratory Therapy support as indicated  Outcome: Progressing

## 2023-09-12 NOTE — CASE MANAGEMENT
Case Management Assessment & Discharge Planning Note    Patient name Kinsey Sol  [facility] [address] [facility] [facility] [id number]  : 1945 Date 2023       Current Admission Date: 2023  Current Admission Diagnosis:Acute pulmonary embolism [facility])   Patient Active Problem List    Diagnosis Date Noted   • Acute pulmonary embolism ([address] [facility]) 2023   • Sepsis due to COVID-19 [facility]) 2023   • Type 2 diabetes mellitus ([address] [facility])    • Hypertension       LOS (days): 1  Geometric Mean LOS (GMLOS) (days): 5.00  Days to GMLOS:4.4     OBJECTIVE:    Risk of Unplanned Readmission Score: 9.6         Current admission status: Inpatient       Preferred Pharmacy:   [address] [place] [address] [facility], [address] [facility]  [address] [facility] [address]  Phone: [phone] Fax: [phone]    Primary Care Provider: No primary care provider on file. Primary Insurance: MEDICARE  Secondary Insurance: [facility]    ASSESSMENT:  Active Health Care Proxies    There are no active Health Care Proxies on file. Patient Information  Admitted from[de-identified] Home  Mental Status: Alert  During Assessment patient was accompanied by: Daughter  Assessment information provided by[de-identified] Daughter  Primary Caregiver: Family  Caregiver's Name[de-identified] daughter, Sona Watts Relationship to Patient[de-identified] Family Member  Support Systems: Daughter  What city do you live in?: pt is from [address] [place] [address] entry access options.  Select all that apply.: Stairs  Number of steps to enter home.: 2  Type of Current Residence: 2 story home  Upon entering residence, is there a bedroom on the main floor (no further steps)?: Yes  Upon entering residence, is there a bathroom on the main floor (no further steps)?: Yes  In the last 12 months, was there a time when you were not able to pay the mortgage or rent on time?: No  In the last 12 months, how many places have you lived?: 1  In the last 12 months, was there a time when you did not have a steady place to sleep or slept in a shelter (including now)?: No  Living Arrangements: Lives w/ Extended Family    Activities of Daily Living Prior to Admission  Functional Status: Independent  Completes ADLs independently?: Yes  Ambulates independently?: Yes  Does patient use assisted devices?: Yes  Assisted Devices (DME) used: Mickey Mahad  Does patient currently own DME?: Yes  What DME does the patient currently own?: Vivian Brennan   Does patient have a history of Outpatient Therapy (PT/OT)?: No  Does the patient have a history of Short-Term Rehab?: No  Does patient have a history of HHC?: No  Does patient currently have [address]?: No         Patient Information Continued  Income Source: Pension/senior care  Does patient have prescription coverage?: Yes  Within the past 12 months, you worried that your food would run out before you got the money to buy more.: Never true  Within the past 12 months, the food you bought just didn't last and you didn't have money to get more.: Never true  Food insecurity resource given?: No  Does patient receive dialysis treatments?: No  Does patient have a history of Mental Health Diagnosis?: No         Means of Transportation  Means of Transport to Appts[de-identified] Family transport  In the past 12 months, has lack of transportation kept you from medical appointments or from getting medications?: No  In the past 12 months, has lack of transportation kept you from meetings, work, or from getting things needed for daily living?: No        DISCHARGE DETAILS:    Discharge planning discussed with[de-identified] pts daughter, Glen Wadsworth, who is at bedside  Ray of Choice: Yes                   Contacts  Patient Contacts: daughterReynaldo  Relationship to Patient[de-identified] Family  Contact Method: Phone  Reason/Outcome: Discharge Planning                   Would you like to participate in our [employer] service program?  : No - Declined                    Pt is from home, lives with her daughter, Nagi Spring, and her family in [facility]. Pt was visiting with another daughter, Komal Sparks, in [place] when she became ill. As per Komal Sparks, pt will be returning to her [address] home at time of discharge. Pt has a first floor set up at [address] daughter's home, pt uses a cane or walker to ambulate, pt does not drive.

## 2023-09-12 NOTE — ASSESSMENT & PLAN NOTE
· Presents with chills and cold-like symptoms for the past 24 hours. Exposure to sick family member. In the ED tested positive for COVID on 9/11/2023. ·  In the ED found to be tachycardic with elevated D-dimer, CTA pe study showing: small filling defect pulmonary emboli within the segmental to subsegmental right lower lobe and right upper lobe pulmonary arteries. RV /LV ratio is less than 0.9. · Troponins negative. Stable on room air. · Low risk PESI score  · Suspect provoked in the setting of COVID-19. No prior history of blood clots per daughter. · Continue heparin VTE protocol  · Echocardiogram-right ventricle is normal in size. Ejection fraction 65%.   · Eliquis sent to patient pharmacy for price check

## 2023-09-12 NOTE — H&P
[address] [facility]  H&P  Name: Adriano Duran 70 y.o. female I MRN: [id number]  Unit/Bed#: -01 I Date of Admission: 9/11/2023   Date of Service: 9/12/2023 I Hospital Day: 1      Assessment/Plan   * Acute pulmonary embolism (HCC)  Assessment & Plan  · Presents with chills and cold-like symptoms for the past 24 hours. Exposure to sick family member. In the ED tested positive for COVID on 9/11/2023. ·  In the ED found to be tachycardic with elevated D-dimer, CTA pe study showing: small filling defect pulmonary emboli within the segmental to subsegmental right lower lobe and right upper lobe pulmonary arteries. RV /LV ratio is less than 0.9. · Troponins negative. Stable on room air. · Low risk PESI score  · Suspect provoked in the setting of COVID-19. No prior history of blood clots per daughter. · Continue heparin VTE protocol  · echocardiogram  · Monitor on telemetry  · Consult case management for anticoagulation pricing    Sepsis due to COVID-19 [facility])  Assessment & Plan  · Present on admission as evidenced by tachycardia, tachypnea, COVID-positive  · CT chest without any evidence of pneumonia  · Admit to mild COVID pathway, patient is currently on room air  · Start remdesivir x4 days , high risk patient due to comorbidities  · Procalcitonin negative x1. We will continue Rocephin for now , consider discontinue antibiotics if procalcitonin negative x2.   · Encourage incentive spirometry and proning      Hypertension  Assessment & Plan  · Continue lisinopril, metoprolol  · Resume Lasix in 24 hours     Type 2 diabetes mellitus ([address] )  Assessment & Plan  No results found for: "HGBA1C"    Recent Labs     09/11/23  2347   POCGLU 134       Blood Sugar Average: Last 72 hrs:  (P) 134   · Hold Oral diabetic medications  · Start sliding scale insulin  · Hypoglycemia protocol       VTE Pharmacologic Prophylaxis:   Moderate Risk (Score 3-4) - Pharmacological DVT Prophylaxis Ordered: heparin drip.  Code Status: Level 1 - Full Code   Discussion with family: Updated  (daughter) at bedside. Anticipated Length of Stay: Patient will be admitted on an inpatient basis with an anticipated length of stay of greater than 2 midnights secondary to acute pe, covid 19 - heparin gtt, socratesdes , tele. Total Time Spent on Date of Encounter in care of patient: 75 minutes This time was spent on one or more of the following: performing physical exam; counseling and coordination of care; obtaining or reviewing history; documenting in the medical record; reviewing/ordering tests, medications or procedures; communicating with other healthcare professionals and discussing with patient's family/caregivers. Chief Complaint: Chills, cold-like symptoms    History of Present Illness:  Selena Irene is a 70 y.o. female with a PMH of HTN , DM2, YARY, HLD who presents with chills and cold-like symptoms for the past 24 hours. Patient is from [facility] but has been staying with one of her daughters for the past week, reports positive sick contact at her daughter's house. Reports associated nausea and congestion. Per daughter and patient no prior history of blood clots. Not currently on blood thinners. Patient denies chest pain, shortness of breath, leg pain, abdominal pain, dysuria, vomiting. Review of Systems:  Review of Systems   Constitutional: Positive for chills. Negative for fever. HENT: Positive for congestion. Respiratory: Negative for cough and shortness of breath. Cardiovascular: Negative for chest pain and leg swelling. Gastrointestinal: Positive for nausea. Negative for abdominal pain, constipation, diarrhea and vomiting. Genitourinary: Negative for difficulty urinating and dysuria. Neurological: Negative for dizziness, weakness, light-headedness, numbness and headaches. All other systems reviewed and are negative.       Past Medical and Surgical History:   Past Medical History: Diagnosis Date   • Diabetes mellitus ([address])    • Hypertension        History reviewed. No pertinent surgical history. Meds/Allergies:  Prior to Admission medications    Medication Sig Start Date End Date Taking? Authorizing Provider   atorvastatin (LIPITOR) 20 mg tablet Take 20 mg by mouth 6/29/23  Yes Historical Provider, MD   bimatoprost (LUMIGAN) 0.01 % ophthalmic drops Apply 1 drop to eye daily 8/17/23  Yes Historical Provider, MD   famotidine (PEPCID) 20 mg tablet Take 20 mg by mouth daily as needed 7/20/23  Yes Historical Provider, MD   furosemide (LASIX) 40 mg tablet Take 40 mg by mouth daily 6/29/23  Yes Historical Provider, MD   glipiZIDE (GLUCOTROL) 5 mg tablet Take 5 mg by mouth 6/29/23  Yes Historical Provider, MD   lisinopril (ZESTRIL) 40 mg tablet Take 40 mg by mouth daily 6/29/23  Yes Historical Provider, MD   metFORMIN (GLUCOPHAGE) 1000 MG tablet Take 1,000 mg by mouth 2 (two) times a day 6/29/23  Yes Historical Provider, MD   metoprolol succinate (TOPROL-XL) 100 mg 24 hr tablet Take 100 mg by mouth daily 6/29/23  Yes Historical Provider, MD   sertraline (ZOLOFT) 25 mg tablet Take 25 mg by mouth daily 9/2/23  Yes Historical Provider, MD     I have reviewed home medications using recent Epic encounter. Allergies: No Known Allergies    Social History:  Marital Status: /Civil Union     Patient Pre-hospital Living Situation: Home  Patient Pre-hospital Level of Mobility: walks  Patient Pre-hospital Diet Restrictions: none  Substance Use History:   Social History     Substance and Sexual Activity   Alcohol Use Never     Social History     Tobacco Use   Smoking Status Never   Smokeless Tobacco Never     Social History     Substance and Sexual Activity   Drug Use Never       Family History:  History reviewed. No pertinent family history.     Physical Exam:     Vitals:   Blood Pressure: 126/70 (09/11/23 2300)  Pulse: (!) 107 (09/11/23 2300)  Temperature: 97.9 °F (36.6 °C) (09/11/23 2300)  Temp Source: Temporal (09/11/23 1901)  Respirations: 22 (09/11/23 2230)  Weight - Scale: 68.2 kg (150 lb 5.7 oz) (09/11/23 2019)  SpO2: 94 % (09/11/23 2300)    Physical Exam  Vitals and nursing note reviewed. Constitutional:       General: She is not in acute distress. Appearance: Normal appearance. She is not ill-appearing, toxic-appearing or diaphoretic. HENT:      Head: Normocephalic and atraumatic. Cardiovascular:      Rate and Rhythm: Regular rhythm. Tachycardia present. Pulses: Normal pulses. Heart sounds: Normal heart sounds. Pulmonary:      Effort: Pulmonary effort is normal. No respiratory distress. Breath sounds: No wheezing, rhonchi or rales. Comments: Clear, diminished at bases. Room air. No accessory muscle use or respiratory distress  Abdominal:      General: Bowel sounds are normal. There is no distension. Palpations: Abdomen is soft. Tenderness: There is no abdominal tenderness. There is no guarding or rebound. Musculoskeletal:         General: Normal range of motion. Right lower leg: No edema. Left lower leg: No edema. Skin:     General: Skin is warm and dry. Neurological:      General: No focal deficit present. Mental Status: She is alert and oriented to person, place, and time.         Additional Data:     Lab Results:  Results from last 7 days   Lab Units 09/11/23  1930   WBC Thousand/uL 10.29*   HEMOGLOBIN g/dL 12.7   HEMATOCRIT % 37.6   PLATELETS Thousands/uL 340   NEUTROS PCT % 78*   LYMPHS PCT % 12*   MONOS PCT % 9   EOS PCT % 0     Results from last 7 days   Lab Units 09/11/23  1930   SODIUM mmol/L 131*   POTASSIUM mmol/L 4.0   CHLORIDE mmol/L 98   CO2 mmol/L 24   BUN mg/dL 15   CREATININE mg/dL 0.82   ANION GAP mmol/L 9   CALCIUM mg/dL 9.3   ALBUMIN g/dL 4.2   TOTAL BILIRUBIN mg/dL 0.47   ALK PHOS U/L 72   ALT U/L 16   AST U/L 18   GLUCOSE RANDOM mg/dL 137         Results from last 7 days   Lab Units 09/11/23  2347   POC GLUCOSE mg/dl 134         Results from last 7 days   Lab Units 09/11/23  1930   LACTIC ACID mmol/L 1.0       Lines/Drains:  Invasive Devices     Peripheral Intravenous Line  Duration           Peripheral IV 09/11/23 Left Antecubital <1 day    Peripheral IV 09/11/23 Right Antecubital <1 day                    Imaging: Reviewed radiology reports from this admission including: chest CT scan  CTA ED chest PE Study   Final Result by Clare Garcia MD (09/11 2150)      Small filling defect/pulmonary emboli within segmental to subsegmental right lower lobe and right upper lobe pulmonary arteries. Measured RV/LV ratio is within normal limits at less than 0.9. I personally discussed this study with Robyn Patelw on 9/11/2023 9:50 PM.               Workstation performed: [id number]         XR chest 1 view portable   ED Interpretation by Sarthak Cotto DO (09/11 2008)   No active disease          EKG and Other Studies Reviewed on Admission:   · EKG: Sinus Tachycardia. .    ** Please Note: This note has been constructed using a voice recognition system.  **

## 2023-09-12 NOTE — PROGRESS NOTES
[address] [facility]  Progress Note  Name: Yohan Segovia  MRN: [id number]  Unit/Bed#: -01 I Date of Admission: 9/11/2023   Date of Service: 9/12/2023 I Hospital Day: 1    Assessment/Plan   Acute pulmonary embolism [facility])  Assessment & Plan  · Presents with chills and cold-like symptoms for the past 24 hours. Exposure to sick family member. In the ED tested positive for COVID on 9/11/2023. ·  In the ED found to be tachycardic with elevated D-dimer, CTA pe study showing: small filling defect pulmonary emboli within the segmental to subsegmental right lower lobe and right upper lobe pulmonary arteries. RV /LV ratio is less than 0.9. · Troponins negative. Stable on room air. · Low risk PESI score  · Suspect provoked in the setting of COVID-19. No prior history of blood clots per daughter. · Continue heparin VTE protocol  · Echocardiogram-right ventricle is normal in size. Ejection fraction 65%. · Eliquis sent to patient pharmacy for price check    * Sepsis due to COVID-19 [facility])  Assessment & Plan  · Present on admission as evidenced by tachycardia, tachypnea, COVID-positive  · CT chest without any evidence of pneumonia  · Admit to mild COVID pathway, patient is currently on room air  · Start remdesivir x4 days , high risk patient due to comorbidities  · Procalcitonin negative x1. We will continue Rocephin for now , consider discontinue antibiotics if procalcitonin negative x2.   · Encourage incentive spirometry and proning      Hypertension  Assessment & Plan  · Continue lisinopril, metoprolol  · Resume Lasix in 24 hours     Type 2 diabetes mellitus [facility])  Assessment & Plan  No results found for: "HGBA1C"    Recent Labs     09/11/23  2347 09/12/23  0821 09/12/23  1210 09/12/23  1512   POCGLU 134 128 143* 182*       Blood Sugar Average: Last 72 hrs:  (P) 146.75   · Hold Oral diabetic medications  · Start sliding scale insulin  · Hypoglycemia protocol             VTE Pharmacologic Prophylaxis:   Moderate Risk (Score 3-4) - Pharmacological DVT Prophylaxis Ordered: heparin drip. Patient Centered Rounds: I performed bedside rounds with nursing staff today. Discussions with Specialists or Other Care Team Provider: None    Education and Discussions with Family / Patient: Updated  (daughter) at bedside. Current Length of Stay: 1 day(s)  Current Patient Status: Inpatient   Certification Statement: The patient will continue to require additional inpatient hospital stay due to To monitor above condition  Discharge Plan: Anticipate discharge in 48-72 hrs to home. Code Status: Level 1 - Full Code    Subjective:   Offered regarding interpretation service. Patient declined verbally. Preferred her daughter at bedside to be translated. Seen and evaluated and examined. Patient saturating well on room air. Complaining only nausea. Denies any chest pain, short of breath, diarrhea, constipation. Denies any body ache or rash. Objective:     Vitals:   Temp (24hrs), Av.8 °F (36.6 °C), Min:97.3 °F (36.3 °C), Max:98.4 °F (36.9 °C)    Temp:  [97.3 °F (36.3 °C)-98.4 °F (36.9 °C)] 97.5 °F (36.4 °C)  HR:  [] 74  Resp:  [16-22] 16  BP: (114-155)/(60-76) 114/60  SpO2:  [92 %-100 %] 94 %  There is no height or weight on file to calculate BMI. Input and Output Summary (last 24 hours): Intake/Output Summary (Last 24 hours) at 2023 1643  Last data filed at 2023 1308  Gross per 24 hour   Intake 1635.41 ml   Output 1500 ml   Net 135.41 ml       Physical Exam:   Physical Exam  Vitals and nursing note reviewed. Constitutional:       Appearance: Normal appearance. She is not ill-appearing. HENT:      Nose: Nose normal. No congestion. Mouth/Throat:      Mouth: Mucous membranes are moist.      Pharynx: Oropharynx is clear. No oropharyngeal exudate. Eyes:      General: No scleral icterus. Left eye: No discharge.       Extraocular Movements: Extraocular movements intact. Conjunctiva/sclera: Conjunctivae normal.      Pupils: Pupils are equal, round, and reactive to light. Cardiovascular:      Rate and Rhythm: Normal rate. Heart sounds: No murmur heard. No friction rub. No gallop. Pulmonary:      Effort: Pulmonary effort is normal. No respiratory distress. Breath sounds: No stridor. No wheezing or rhonchi. Abdominal:      General: Abdomen is flat. Bowel sounds are normal. There is no distension. Palpations: There is no mass. Tenderness: There is no abdominal tenderness. Hernia: No hernia is present. Musculoskeletal:         General: No swelling, tenderness, deformity or signs of injury. Normal range of motion. Cervical back: Normal range of motion. No rigidity. Lymphadenopathy:      Cervical: No cervical adenopathy. Skin:     General: Skin is warm. Capillary Refill: Capillary refill takes less than 2 seconds. Neurological:      General: No focal deficit present. Mental Status: She is alert and oriented to person, place, and time. Cranial Nerves: No cranial nerve deficit. Sensory: No sensory deficit. Motor: No weakness.       Coordination: Coordination normal.         Additional Data:     Labs:  Results from last 7 days   Lab Units 09/12/23  0417   WBC Thousand/uL 8.17   HEMOGLOBIN g/dL 11.7   HEMATOCRIT % 33.7*   PLATELETS Thousands/uL 281   NEUTROS PCT % 66   LYMPHS PCT % 24   MONOS PCT % 10   EOS PCT % 0     Results from last 7 days   Lab Units 09/12/23  0417   SODIUM mmol/L 132*   POTASSIUM mmol/L 3.5   CHLORIDE mmol/L 100   CO2 mmol/L 25   BUN mg/dL 12   CREATININE mg/dL 0.77   ANION GAP mmol/L 7   CALCIUM mg/dL 8.6   ALBUMIN g/dL 3.5   TOTAL BILIRUBIN mg/dL 0.29   ALK PHOS U/L 58   ALT U/L 12   AST U/L 14   GLUCOSE RANDOM mg/dL 185*         Results from last 7 days   Lab Units 09/12/23  1512 09/12/23  1210 09/12/23  0821 09/11/23  2347   POC GLUCOSE mg/dl 182* 143* 128 134 Results from last 7 days   Lab Units 09/12/23  0417 09/11/23 1930   LACTIC ACID mmol/L  --  1.0   PROCALCITONIN ng/ml 0.07  --        Lines/Drains:  Invasive Devices     Peripheral Intravenous Line  Duration           Peripheral IV 09/11/23 Left Antecubital <1 day    Peripheral IV 09/11/23 Right Antecubital <1 day                  Telemetry:  Telemetry Orders (From admission, onward)             24 Hour Telemetry Monitoring  (ED Bridging Orders Panel)  Continuous x 24 Hours (Telem)        Question:  Reason for 24 Hour Telemetry  Answer:  Pulmonary Embolism                 Telemetry Reviewed: Normal Sinus Rhythm  Indication for Continued Telemetry Use: PE             Imaging: No pertinent imaging reviewed. Recent Cultures (last 7 days):   Results from last 7 days   Lab Units 09/11/23 1937 09/11/23 1930   BLOOD CULTURE  Received in Microbiology Lab. Culture in Progress. Received in Microbiology Lab. Culture in Progress.        Last 24 Hours Medication List:   Current Facility-Administered Medications   Medication Dose Route Frequency Provider Last Rate   • acetaminophen  650 mg Oral Q4H PRN SARAH Grant     • atorvastatin  20 mg Oral Daily With SARAH Herrera     • benzonatate  100 mg Oral TID PRN SARAH Grant     • bimatoprost  1 drop Ophthalmic Daily SARAH Grant     • calcium carbonate  1,000 mg Oral Daily PRN SARAH Grant     • cefTRIAXone  1,000 mg Intravenous Q24H SARAH Grant     • [START ON 9/13/2023] furosemide  40 mg Oral Daily SARAH Grant     • guaiFENesin  600 mg Oral Q12H [facility] HOME SARAH Grant     • heparin (porcine)  3-30 Units/kg/hr (Order-Specific) Intravenous Titrated Sruthi Aburto DO 12 Units/kg/hr (09/12/23 1417)   • insulin lispro  1-5 Units Subcutaneous TID AC SARAH Grant     • insulin lispro  1-5 Units Subcutaneous HS Felicia Carrasco SARAH     • lisinopril  40 mg Oral Daily SARAH Jiménez     • metoprolol succinate  100 mg Oral Daily SARAH Jiménez     • ondansetron  4 mg Intravenous Q6H PRN SARAH Jiménez     • [START ON 9/13/2023] remdesivir  100 mg Intravenous Q24H SARAH Jiménez     • sertraline  25 mg Oral Daily SARAH Jiménez          Today, Patient Was Seen By: Tosha Herron MD    **Please Note: This note may have been constructed using a voice recognition system. **

## 2023-09-13 VITALS
HEART RATE: 75 BPM | WEIGHT: 150.35 LBS | TEMPERATURE: 97.5 F | RESPIRATION RATE: 18 BRPM | OXYGEN SATURATION: 95 % | DIASTOLIC BLOOD PRESSURE: 64 MMHG | SYSTOLIC BLOOD PRESSURE: 133 MMHG

## 2023-09-13 LAB
ALBUMIN SERPL BCP-MCNC: 3.4 G/DL (ref 3.5–5)
ALP SERPL-CCNC: 48 U/L (ref 34–104)
ALT SERPL W P-5'-P-CCNC: 13 U/L (ref 7–52)
ANION GAP SERPL CALCULATED.3IONS-SCNC: 6 MMOL/L
APTT PPP: 80 SECONDS (ref 23–37)
AST SERPL W P-5'-P-CCNC: 19 U/L (ref 13–39)
BASOPHILS # BLD AUTO: 0.04 THOUSANDS/ÂΜL (ref 0–0.1)
BASOPHILS NFR BLD AUTO: 1 % (ref 0–1)
BILIRUB SERPL-MCNC: 0.35 MG/DL (ref 0.2–1)
BUN SERPL-MCNC: 12 MG/DL (ref 5–25)
CALCIUM ALBUM COR SERPL-MCNC: 9.2 MG/DL (ref 8.3–10.1)
CALCIUM SERPL-MCNC: 8.7 MG/DL (ref 8.4–10.2)
CHLORIDE SERPL-SCNC: 99 MMOL/L (ref 96–108)
CO2 SERPL-SCNC: 25 MMOL/L (ref 21–32)
CREAT SERPL-MCNC: 0.62 MG/DL (ref 0.6–1.3)
EOSINOPHIL # BLD AUTO: 0.01 THOUSAND/ÂΜL (ref 0–0.61)
EOSINOPHIL NFR BLD AUTO: 0 % (ref 0–6)
ERYTHROCYTE [DISTWIDTH] IN BLOOD BY AUTOMATED COUNT: 14.2 % (ref 11.6–15.1)
GFR SERPL CREATININE-BSD FRML MDRD: 87 ML/MIN/1.73SQ M
GLUCOSE SERPL-MCNC: 100 MG/DL (ref 65–140)
GLUCOSE SERPL-MCNC: 105 MG/DL (ref 65–140)
GLUCOSE SERPL-MCNC: 180 MG/DL (ref 65–140)
HCT VFR BLD AUTO: 33.7 % (ref 34.8–46.1)
HGB BLD-MCNC: 11.4 G/DL (ref 11.5–15.4)
IMM GRANULOCYTES # BLD AUTO: 0.01 THOUSAND/UL (ref 0–0.2)
IMM GRANULOCYTES NFR BLD AUTO: 0 % (ref 0–2)
LYMPHOCYTES # BLD AUTO: 2.03 THOUSANDS/ÂΜL (ref 0.6–4.47)
LYMPHOCYTES NFR BLD AUTO: 46 % (ref 14–44)
MCH RBC QN AUTO: 31 PG (ref 26.8–34.3)
MCHC RBC AUTO-ENTMCNC: 33.8 G/DL (ref 31.4–37.4)
MCV RBC AUTO: 92 FL (ref 82–98)
MONOCYTES # BLD AUTO: 0.61 THOUSAND/ÂΜL (ref 0.17–1.22)
MONOCYTES NFR BLD AUTO: 14 % (ref 4–12)
NEUTROPHILS # BLD AUTO: 1.73 THOUSANDS/ÂΜL (ref 1.85–7.62)
NEUTS SEG NFR BLD AUTO: 39 % (ref 43–75)
NRBC BLD AUTO-RTO: 0 /100 WBCS
PLATELET # BLD AUTO: 258 THOUSANDS/UL (ref 149–390)
PMV BLD AUTO: 9.2 FL (ref 8.9–12.7)
POTASSIUM SERPL-SCNC: 4 MMOL/L (ref 3.5–5.3)
PROT SERPL-MCNC: 6.2 G/DL (ref 6.4–8.4)
RBC # BLD AUTO: 3.68 MILLION/UL (ref 3.81–5.12)
SODIUM SERPL-SCNC: 130 MMOL/L (ref 135–147)
WBC # BLD AUTO: 4.43 THOUSAND/UL (ref 4.31–10.16)

## 2023-09-13 PROCEDURE — 82948 REAGENT STRIP/BLOOD GLUCOSE: CPT

## 2023-09-13 PROCEDURE — 99239 HOSP IP/OBS DSCHRG MGMT >30: CPT | Performed by: FAMILY MEDICINE

## 2023-09-13 PROCEDURE — 80053 COMPREHEN METABOLIC PANEL: CPT | Performed by: FAMILY MEDICINE

## 2023-09-13 PROCEDURE — 85025 COMPLETE CBC W/AUTO DIFF WBC: CPT | Performed by: FAMILY MEDICINE

## 2023-09-13 PROCEDURE — 85730 THROMBOPLASTIN TIME PARTIAL: CPT

## 2023-09-13 RX ORDER — DOXYCYCLINE 100 MG/1
100 TABLET ORAL 2 TIMES DAILY
Qty: 10 TABLET | Refills: 0 | Status: SHIPPED | OUTPATIENT
Start: 2023-09-13 | End: 2023-09-18

## 2023-09-13 RX ORDER — PREDNISONE 20 MG/1
40 TABLET ORAL DAILY
Qty: 6 TABLET | Refills: 0 | Status: SHIPPED | OUTPATIENT
Start: 2023-09-13 | End: 2023-09-16

## 2023-09-13 RX ORDER — ALBUTEROL SULFATE 90 UG/1
2 AEROSOL, METERED RESPIRATORY (INHALATION) EVERY 6 HOURS PRN
Qty: 8.5 G | Refills: 0 | Status: SHIPPED | OUTPATIENT
Start: 2023-09-13

## 2023-09-13 RX ORDER — BENZONATATE 100 MG/1
100 CAPSULE ORAL 3 TIMES DAILY PRN
Qty: 20 CAPSULE | Refills: 0 | Status: SHIPPED | OUTPATIENT
Start: 2023-09-13

## 2023-09-13 RX ORDER — GUAIFENESIN 600 MG/1
600 TABLET, EXTENDED RELEASE ORAL EVERY 12 HOURS SCHEDULED
Qty: 15 TABLET | Refills: 0 | Status: SHIPPED | OUTPATIENT
Start: 2023-09-13

## 2023-09-13 RX ORDER — ONDANSETRON 4 MG/1
4 TABLET, FILM COATED ORAL EVERY 8 HOURS PRN
Qty: 20 TABLET | Refills: 0 | Status: SHIPPED | OUTPATIENT
Start: 2023-09-13

## 2023-09-13 RX ADMIN — SERTRALINE HYDROCHLORIDE 25 MG: 25 TABLET, FILM COATED ORAL at 09:34

## 2023-09-13 RX ADMIN — REMDESIVIR 100 MG: 100 INJECTION, POWDER, LYOPHILIZED, FOR SOLUTION INTRAVENOUS at 00:42

## 2023-09-13 RX ADMIN — LISINOPRIL 40 MG: 20 TABLET ORAL at 09:34

## 2023-09-13 RX ADMIN — GUAIFENESIN 600 MG: 600 TABLET ORAL at 09:34

## 2023-09-13 RX ADMIN — METOPROLOL SUCCINATE 100 MG: 100 TABLET, EXTENDED RELEASE ORAL at 09:34

## 2023-09-13 RX ADMIN — APIXABAN 10 MG: 5 TABLET, FILM COATED ORAL at 11:10

## 2023-09-13 RX ADMIN — FUROSEMIDE 40 MG: 40 TABLET ORAL at 09:34

## 2023-09-13 RX ADMIN — INSULIN LISPRO 1 UNITS: 100 INJECTION, SOLUTION INTRAVENOUS; SUBCUTANEOUS at 11:10

## 2023-09-13 RX ADMIN — BIMATOPROST 1 DROP: 0.3 SOLUTION/ DROPS OPHTHALMIC at 09:38

## 2023-09-13 RX ADMIN — HEPARIN SODIUM 9 UNITS/KG/HR: 10000 INJECTION, SOLUTION INTRAVENOUS at 02:32

## 2023-09-13 NOTE — CASE MANAGEMENT
Case Management Discharge Planning Note    Patient name Anderson Romo  Location [address] [facility] 325/-01 MRN [id number]  : 1945 Date 2023       Current Admission Date: 2023  [facility]   Patient Active Problem List    Diagnosis Date Noted   • Acute pulmonary embolism ([address] [facility]) 2023   • [facility] 20[facility]   • Type 2 diabetes mellitus ([address] )    • Hypertension       LOS (days): 2  Geometric Mean LOS (GMLOS) (days): 5.00  Days to GMLOS:3.5     OBJECTIVE:  Risk of Unplanned Readmission Score: 10.37         Current admission status: Inpatient   Preferred Pharmacy:   [address] [place] [address] [address] Ph[place]ong Nelson, [address] [place] [id number]  Phone: [phone] Fax: [address] [facility], [address] [facility] [address] [facility]. [place] [address]  Phone: [phone] Fax: [phone]    Primary Care Provider: No primary care provider on file.     Primary Insurance: MEDICARE  Secondary Insurance: [facility]    DISCHARGE DETAILS:    Discharge planning discussed with[de-identified] dwight vitale via phone        CM contacted family/caregiver?: Yes  Were Treatment Team discharge recommendations reviewed with patient/caregiver?: Yes  Did patient/caregiver verbalize understanding of patient care needs?: Yes  Were patient/caregiver advised of the risks associated with not following Treatment Team discharge recommendations?: Yes    Contacts  Contact Method: Phone  Reason/Outcome: Discharge Planning              Other Referral/Resources/Interventions Provided:  Financial Resources Provided: Prescription Discount Card (provided with eliquis card)    Would you like to participate in our [id number] program?  : No - Declined    Treatment Team Recommendation: Home  Discharge Destination Plan[de-identified] Home   call placed to [id number] [employer] and price check for eliquis is 4.30$. pt also provided with discount card. They are ordering and will be in stock tomorrow. Spoke with pharmacy and we are able to send home evening dose. Primary nurse made aware of same and she will review AVS. Daughter to transport home.

## 2023-09-13 NOTE — DISCHARGE SUMMARY
[address] [facility]  Discharge- Cherry Bynum 1945, 70 y.o. female MRN: [id number]  Unit/Bed#: MS Box Encounter: [id number]  Primary Care Provider: No primary care provider on file. Date and time admitted to hospital: 9/11/2023  6:57 PM    Acute pulmonary embolism ([address])  Assessment & Plan  · Provoked by COVID-19, first episode  · Presents with chills and cold-like symptoms for the past 24 hours. Exposure to sick family member. In the ED tested positive for COVID on 9/11/2023. ·  In the ED found to be tachycardic with elevated D-dimer, CTA pe study showing: small filling defect pulmonary emboli within the segmental to subsegmental right lower lobe and right upper lobe pulmonary arteries. RV /LV ratio is less than 0.9. · Troponins negative. Stable on room air. · Low risk PESI score  · Suspect provoked in the setting of COVID-19. No prior history of blood clots per daughter. · Received heparin drip while remain in the hospital, transition to p.o. Eliquis. Price check with pharmacy, it will cost $4.30. · Echocardiogram-right ventricle is normal in size. Ejection fraction 65%. · Recommending patient to continue at least 3 months of Eliquis treatment. Bleeding precaution provided     * Sepsis due to COVID-19 [facility])  Assessment & Plan  · Present on admission as evidenced by tachycardia, tachypnea, COVID-positive  · CT chest without any evidence of pneumonia  · Condition significantly improved, blood culture shows no growth. Patient remained asymptomatic, saturating well on room air. May continue doxycycline for 5 more days. Continue prednisone 40 mg for next 3 days. Hypertension  Assessment & Plan  · Resume home medication.     Type 2 diabetes mellitus [facility])  Assessment & Plan  No results found for: "HGBA1C"    Recent Labs     09/12/23  1210 09/12/23  1512 09/12/23  2033 09/13/23  0756   POCGLU 143* 182* 120 100       Blood Sugar Average: Last 72 hrs:  (P) 134.5   · Hold Oral diabetic medications  · Start sliding scale insulin  · Hypoglycemia protocol        Medical Problems     Resolved Problems  Date Reviewed: 9/12/2023   None       Discharging Physician / Practitioner: Hugo Pretty MD  PCP: No primary care provider on file. Admission Date:   Admission Orders (From admission, onward)     Ordered        09/11/23 3583 [address]  Once                      Discharge Date: 09/13/23    Consultations During Hospital Stay:  · none    Procedures Performed:   CTA ED chest PE Study   Final Result by Paty Douglas MD (09/11 2150)      Small filling defect/pulmonary emboli within segmental to subsegmental right lower lobe and right upper lobe pulmonary arteries. Measured RV/LV ratio is within normal limits at less than 0.9. I personally discussed this study with Chin Smith on 9/11/2023 9:50 PM.               Workstation performed: [id number]         XR chest 1 view portable   ED Interpretation by Vaishali Huizar DO (09/11 2008)   No active disease      Final Result by Stephany Miguel MD (09/12 1184)      No acute cardiopulmonary disease. Workstation performed: [id number]         ·   · ECHO:  Interpretation Summary       •  Left Ventricle: Wall thickness is mildly increased. There is mild concentric hypertrophy. The left ventricular ejection fraction is 65%. Systolic function is normal. Wall motion is normal. Diastolic function is mildly abnormal, consistent with grade I (abnormal) relaxation.     Findings    Left Ventricle Left ventricular cavity size is small. Wall thickness is mildly increased. There is mild concentric hypertrophy. The left ventricular ejection fraction is 65%. Systolic function is normal.  Wall motion is normal. Diastolic function is mildly abnormal, consistent with grade I (abnormal) relaxation. Right Ventricle Right ventricular cavity size is normal. Systolic function is normal.      Left Atrium The atrium is normal in size. Right Atrium Right atrium is not well visualized. Aortic Valve The aortic valve is trileaflet. The leaflets are not thickened. The leaflets exhibit normal mobility. There is no evidence of regurgitation. The aortic valve has no significant stenosis. Mitral Valve There is no evidence of regurgitation. There is no evidence of stenosis. The mitral valve has normal structure and normal function. Tricuspid Valve Tricuspid valve structure is normal. There is no evidence of regurgitation. There is no evidence of stenosis. There is no indirect evidence of pulmonary hypertension. Pulmonic Valve Pulmonic valve structure is normal. There is no evidence of regurgitation. There is no evidence of stenosis. Ascending Aorta The aortic root is normal in size. The ascending aorta is normal in size. IVC/SVC The inferior vena cava is normal in size. Respirophasic changes were blunted (less than 50% variation). Pericardium There is no pericardial effusion. The pericardium is normal in appearance. ·     Significant Findings / Test Results:   Lab Results   Component Value Date    WBC 4.43 09/13/2023    HGB 11.4 (L) 09/13/2023    HCT 33.7 (L) 09/13/2023    MCV 92 09/13/2023     09/13/2023   ·   Lab Results   Component Value Date    SODIUM 130 (L) 09/13/2023    K 4.0 09/13/2023    CL 99 09/13/2023    CO2 25 09/13/2023    AGAP 6 09/13/2023    BUN 12 09/13/2023    CREATININE 0.62 09/13/2023    GLUC 105 09/13/2023    CALCIUM 8.7 09/13/2023    AST 19 09/13/2023    ALT 13 09/13/2023    ALKPHOS 48 09/13/2023    TP 6.2 (L) 09/13/2023    TBILI 0.35 09/13/2023    EGFR 87 09/13/2023   ·   Collected Updated Procedure Result Status Patient Facility Result Comment    09/11/2023 1937 09/12/2023 2301 Blood culture #2 [[id number]]   Blood from Arm, Right    Preliminary result [address]  Component Value   Blood Culture No Growth at 24 hrs.  P             09/11/2023 1930 09/11/2023 2031 FLU/RSV/COVID - if FLU/RSV clinically relevant [[id number]]    (Abnormal)   Nares from Nasopharyngeal Swab    Final result [address] - copy/paste COVID Guidelines URL to browser: [email or web address]   SARS-CoV-2 assay is a Nucleic Acid Amplification assay intended for the   qualitative detection of nucleic acid from SARS-CoV-2 in nasopharyngeal   swabs. Results are for the presumptive identification of SARS-CoV-2 RNA. Positive results are indicative of infection with SARS-CoV-2, the virus   causing COVID-19, but do not rule out bacterial infection or co-infection   with other viruses. Laboratories within the [facility] and its   territories are required to report all positive results to the appropriate   public health authorities. Negative results do not preclude SARS-CoV-2   infection and should not be used as the sole basis for treatment or other   patient management decisions. Negative results must be combined with   clinical observations, patient history, and epidemiological information. This test has not been FDA cleared or approved. This test has been authorized by FDA under an Emergency Use Authorization   (EUA). This test is only authorized for the duration of time the   declaration that circumstances exist justifying the authorization of the   emergency use of an in vitro diagnostic tests for detection of SARS-CoV-2   virus and/or diagnosis of COVID-19 infection under section 564(b)(1) of   the Act, 21 U. S.C. 391IZS-4(M)(3), unless the authorization is terminated   or revoked sooner. The test has been validated but independent review by FDA   and CLIA is pending. Test performed using [employer]: This RT-PCR assay targets N2,   a region unique to SARS-CoV-2. A conserved region in the E-gene was chosen   for pan-Sarbecovirus detection which includes SARS-CoV-2. According to CMS-2020-01-R, this platform meets the definition of high-throughput technology. Component Value   SARS-CoV-2 Positive Abnormal    INFLUENZA A PCR Negative   INFLUENZA B PCR Negative   RSV PCR Negative          09/11/2023 1930 09/12/2023 2301 Blood culture #1 [[id number]]   Blood from Arm, Left    Preliminary result [address]  Component Value   Blood Culture No Growth at 24 hrs. P             09/11/2023 1930 09/11/2023 2017 Strep A PCR [[id number]]   Throat    Final result [address]  Component Value   STREP A PCR Not Detected            ·     Incidental Findings:   · As mentioned above  · I reviewed the above mentioned incidental findings with the patient and/or family and they expressed understanding. Test Results Pending at Discharge (will require follow up):   · none     Outpatient Tests Requested:  · May need CTA PE study after 3 months with PCP to reevaluate pulmonary embolism    Complications:  none    Reason for Admission: Chills, cold-like symptoms    Hospital Course:   Adriano Duran is a 70 y.o. female patient who originally presented to the hospital on 9/11/2023 due to chills, colic symptoms. Patient admitted under the diagnosis of sepsis secondary to COVID as well as acute pulmonary embolism secondary to COVID. Patient initially started heparin drip, also received 2 doses of remdesivir, and 2 days of ceftriaxone with steroid therapy. Patient remained on room air. With the treatment, patient condition significantly improved. Later on heparin transition to Eliquis. Price check with pharmacy, it will cost $4.30. Since pulmonary evaluation is provoked by COVID and first episode, recommending at least 3 months of treatment. Bleeding precaution provided. Patient will need repeat CTA PE study done after 3 months with PCP for reevaluation of pulm embolism.     All lab results, imaging findings, treatment plan and option discussed in details with patient and family member. Verbalizes to understand and agrees. Please see above list of diagnoses and related plan for additional information. Condition at Discharge: stable    Discharge Day Visit / Exam:   Subjective: Seen and evaluated). Resting comfortably. Denies any significant complaint. (Bulgarian-speaking  used via [place], [place]- [id number])  Vitals: Blood Pressure: 133/64 (09/13/23 0934)  Pulse: 75 (09/13/23 0934)  Temperature: 97.5 °F (36.4 °C) (09/13/23 0800)  Temp Source: Temporal (09/12/23 2315)  Respirations: 18 (09/13/23 0800)  Weight - Scale: 68.2 kg (150 lb 5.7 oz) (09/12/23 1308)  SpO2: 94 % (09/13/23 0800)  Exam:   Physical Exam  Vitals and nursing note reviewed. Constitutional:       Appearance: Normal appearance. She is not ill-appearing or diaphoretic. HENT:      Mouth/Throat:      Mouth: Mucous membranes are moist.      Pharynx: Oropharynx is clear. No oropharyngeal exudate. Eyes:      General: No scleral icterus. Left eye: No discharge. Extraocular Movements: Extraocular movements intact. Conjunctiva/sclera: Conjunctivae normal.      Pupils: Pupils are equal, round, and reactive to light. Cardiovascular:      Rate and Rhythm: Normal rate. Heart sounds: Normal heart sounds. No murmur heard. No friction rub. No gallop. Pulmonary:      Effort: Pulmonary effort is normal. No respiratory distress. Breath sounds: No stridor. No wheezing or rhonchi. Musculoskeletal:      Cervical back: Normal range of motion. Skin:     General: Skin is warm. Neurological:      General: No focal deficit present. Mental Status: She is alert and oriented to person, place, and time. Mental status is at baseline. Cranial Nerves: No cranial nerve deficit. Sensory: No sensory deficit. Motor: No weakness.       Coordination: Coordination normal.   Psychiatric:         Mood and Affect: Mood normal. Discussion with Family: Updated  (daughter) at bedside. Discharge instructions/Information to patient and family:   See after visit summary for information provided to patient and family. Provisions for Follow-Up Care:  See after visit summary for information related to follow-up care and any pertinent home health orders. Disposition:   Home    Planned Readmission: If condition get worse. Discharge Statement:   Greater than 50% of the total time was spent examining patient, answering all patient questions, arranging and discussing plan of care with patient as well as directly providing post-discharge instructions. Additional time then spent on discharge activities. Discharge Medications:  See after visit summary for reconciled discharge medications provided to patient and/or family.       **Please Note: This note may have been constructed using a voice recognition system**

## 2023-09-13 NOTE — PLAN OF CARE
Problem: Potential for Falls  Goal: Patient will remain free of falls  Description: INTERVENTIONS:  - Educate patient/family on patient safety including physical limitations  - Instruct patient to call for assistance with activity   - Consult OT/PT to assist with strengthening/mobility   - Keep Call bell within reach  - Keep bed low and locked with side rails adjusted as appropriate  - Keep care items and personal belongings within reach  - Initiate and maintain comfort rounds  - Make Fall Risk Sign visible to staff  - Offer Toileting every 2 Hours, in advance of need  - Initiate/Maintain bed/chair alarm  - Obtain necessary fall risk management equipment:   - Apply yellow socks and bracelet for high fall risk patients  - Consider moving patient to room near nurses station  Outcome: Progressing     Problem: RESPIRATORY - ADULT  Goal: Achieves optimal ventilation and oxygenation  Description: INTERVENTIONS:  - Assess for changes in respiratory status JEFFREY, SOB cyanosis  - Assess for changes in mentation and behavior  - Position to facilitate oxygenation and minimize respiratory effort  - Oxygen administered by appropriate delivery if ordered  - Initiate smoking cessation education as indicated  - Encourage broncho-pulmonary hygiene including cough, deep breathe, Incentive Spirometry  - Assess the need for suctioning and aspirate as needed  - Assess and instruct to report SOB or any respiratory difficulty  - Respiratory Therapy support as indicated  Outcome: Progressing

## 2023-09-13 NOTE — CASE MANAGEMENT
Case Management Progress Note    Patient name [place]  Location [address] [facility] [place] 325/-01 MRN [id number]  : 1945 Date 2023       LOS (days): 2  Geometric Mean LOS (GMLOS) (days): 5.00  Days to GMLOS:3.5        OBJECTIVE:        Current admission status: Inpatient  Preferred Pharmacy:   [address] [place] [address] [id number] Chela Marte, [address] [place] [address]  [address] [place] [id number]  Phone: [phone] Fax: [phone] [facility], [address] [place] [address],[address] [facility]. [address] Clarence Kristal [address]  Phone: [phone] Fax: [phone]    Primary Care Provider: No primary care provider on file. Primary Insurance: MEDICARE  Secondary Insurance: [id number] [employer] NOTE:called [address] [employer] x5. No answer. Verified phone number online, it is correct phone number. Call placed to daughter to update her that pt will be dc home once we verify eliquis will be covered. Eliquis coupon given to primary nurse to give to pt. Daughter instructed on same. Will continue to call pharmacy.

## 2023-09-13 NOTE — ASSESSMENT & PLAN NOTE
· Present on admission as evidenced by tachycardia, tachypnea, COVID-positive  · CT chest without any evidence of pneumonia  · Condition significantly improved, blood culture shows no growth. Patient remained asymptomatic, saturating well on room air. May continue doxycycline for 5 more days. Continue prednisone 40 mg for next 3 days.

## 2023-09-13 NOTE — ASSESSMENT & PLAN NOTE
No results found for: "HGBA1C"    Recent Labs     09/12/23  1210 09/12/23  1512 09/12/23  2033 09/13/23  0756   POCGLU 143* 182* 120 100       Blood Sugar Average: Last 72 hrs:  (P) 134.5   · Hold Oral diabetic medications  · Start sliding scale insulin  · Hypoglycemia protocol

## 2023-09-13 NOTE — INCIDENTAL FINDINGS
The following findings require follow up:  Radiographic finding   Finding: CTA ED chest PE Study: Small filling defect/pulmonary emboli within segmental to subsegmental right lower lobe and right upper lobe pulmonary arteries.  Measured RV/LV ratio is within normal limits at less than 0.9     Follow up required: yes   Follow up should be done within 1 week(s)    Please notify the following clinician to assist with the follow up:   [place], [address] [place] [address] [address]   [phone] Marisabel Bartholomew   [phone] (Fax)

## 2023-09-13 NOTE — ASSESSMENT & PLAN NOTE
· Provoked by COVID-19, first episode  · Presents with chills and cold-like symptoms for the past 24 hours. Exposure to sick family member. In the ED tested positive for COVID on 9/11/2023. ·  In the ED found to be tachycardic with elevated D-dimer, CTA pe study showing: small filling defect pulmonary emboli within the segmental to subsegmental right lower lobe and right upper lobe pulmonary arteries. RV /LV ratio is less than 0.9. · Troponins negative. Stable on room air. · Low risk PESI score  · Suspect provoked in the setting of COVID-19. No prior history of blood clots per daughter. · Received heparin drip while remain in the hospital, transition to p.o. Eliquis. Price check with pharmacy, it will cost $4.30. · Echocardiogram-right ventricle is normal in size. Ejection fraction 65%. · Recommending patient to continue at least 3 months of Eliquis treatment.   Bleeding precaution provided

## 2023-09-16 LAB
BACTERIA BLD CULT: NORMAL
BACTERIA BLD CULT: NORMAL

## 2024-11-15 NOTE — NURSING NOTE
Discharge instructions reviewed with patient and daughter using the interpretor. Both verbalized an understanding and had no questions at this time. Patient and daughter assisted out by RN. smokes approximately one half pack cigarettes per day for over 50 years  encourage smoking cessation